# Patient Record
Sex: FEMALE | Race: OTHER | HISPANIC OR LATINO
[De-identification: names, ages, dates, MRNs, and addresses within clinical notes are randomized per-mention and may not be internally consistent; named-entity substitution may affect disease eponyms.]

---

## 2017-01-18 ENCOUNTER — APPOINTMENT (OUTPATIENT)
Dept: OBGYN | Facility: CLINIC | Age: 57
End: 2017-01-18

## 2017-01-18 VITALS
HEIGHT: 60 IN | BODY MASS INDEX: 29.84 KG/M2 | WEIGHT: 152 LBS | DIASTOLIC BLOOD PRESSURE: 80 MMHG | SYSTOLIC BLOOD PRESSURE: 126 MMHG

## 2017-01-18 DIAGNOSIS — Z78.9 OTHER SPECIFIED HEALTH STATUS: ICD-10-CM

## 2017-01-18 DIAGNOSIS — Z20.5 CONTACT WITH AND (SUSPECTED) EXPOSURE TO VIRAL HEPATITIS: ICD-10-CM

## 2017-01-18 LAB
DATE COLLECTED: NORMAL
HEMOCCULT SP1 STL QL: NEGATIVE
QUALITY CONTROL: YES

## 2017-01-20 LAB
APPEARANCE: CLEAR
BACTERIA UR CULT: NORMAL
BACTERIA: NEGATIVE
BILIRUBIN URINE: NEGATIVE
BLOOD URINE: NEGATIVE
COLOR: YELLOW
GLUCOSE QUALITATIVE U: NORMAL MG/DL
HYALINE CASTS: 1 /LPF
KETONES URINE: NEGATIVE
LEUKOCYTE ESTERASE URINE: ABNORMAL
MICROSCOPIC-UA: NORMAL
NITRITE URINE: NEGATIVE
PH URINE: 5.5
PROTEIN URINE: NEGATIVE MG/DL
RED BLOOD CELLS URINE: 2 /HPF
SPECIFIC GRAVITY URINE: 1.02
SQUAMOUS EPITHELIAL CELLS: 4 /HPF
UROBILINOGEN URINE: NORMAL MG/DL
WHITE BLOOD CELLS URINE: 4 /HPF

## 2017-01-27 LAB
HBV SURFACE AB SER QL: NONREACTIVE
HBV SURFACE AG SER QL: NONREACTIVE
HCV AB SER QL: NONREACTIVE
HCV S/CO RATIO: 0.11 S/CO
HIV1+2 AB SPEC QL IA.RAPID: NONREACTIVE
HPV HIGH+LOW RISK DNA PNL CVX: NEGATIVE
T PALLIDUM AB SER QL IA: NEGATIVE

## 2017-03-16 ENCOUNTER — APPOINTMENT (OUTPATIENT)
Dept: PULMONOLOGY | Facility: CLINIC | Age: 57
End: 2017-03-16

## 2017-03-16 VITALS — DIASTOLIC BLOOD PRESSURE: 60 MMHG | OXYGEN SATURATION: 98 % | HEART RATE: 81 BPM | SYSTOLIC BLOOD PRESSURE: 126 MMHG

## 2017-03-16 VITALS — WEIGHT: 152 LBS | BODY MASS INDEX: 29.69 KG/M2

## 2017-04-11 RX ORDER — FLUTICASONE FUROATE AND VILANTEROL TRIFENATATE 100; 25 UG/1; UG/1
100-25 POWDER RESPIRATORY (INHALATION) DAILY
Qty: 1 | Refills: 5 | Status: DISCONTINUED | COMMUNITY
Start: 2017-03-16 | End: 2017-04-11

## 2017-04-19 ENCOUNTER — APPOINTMENT (OUTPATIENT)
Dept: CARDIOLOGY | Facility: CLINIC | Age: 57
End: 2017-04-19

## 2017-04-25 ENCOUNTER — APPOINTMENT (OUTPATIENT)
Dept: CARDIOLOGY | Facility: CLINIC | Age: 57
End: 2017-04-25

## 2017-04-25 ENCOUNTER — NON-APPOINTMENT (OUTPATIENT)
Age: 57
End: 2017-04-25

## 2017-04-25 VITALS — DIASTOLIC BLOOD PRESSURE: 92 MMHG | SYSTOLIC BLOOD PRESSURE: 129 MMHG

## 2017-04-25 VITALS
DIASTOLIC BLOOD PRESSURE: 90 MMHG | WEIGHT: 156 LBS | BODY MASS INDEX: 30.63 KG/M2 | HEART RATE: 90 BPM | OXYGEN SATURATION: 98 % | SYSTOLIC BLOOD PRESSURE: 133 MMHG | HEIGHT: 60 IN

## 2017-06-15 ENCOUNTER — FORM ENCOUNTER (OUTPATIENT)
Age: 57
End: 2017-06-15

## 2017-06-16 ENCOUNTER — OUTPATIENT (OUTPATIENT)
Dept: OUTPATIENT SERVICES | Facility: HOSPITAL | Age: 57
LOS: 1 days | End: 2017-06-16
Payer: MEDICAID

## 2017-06-16 ENCOUNTER — APPOINTMENT (OUTPATIENT)
Dept: RADIOLOGY | Facility: CLINIC | Age: 57
End: 2017-06-16

## 2017-06-16 ENCOUNTER — APPOINTMENT (OUTPATIENT)
Dept: FAMILY MEDICINE | Facility: CLINIC | Age: 57
End: 2017-06-16

## 2017-06-16 VITALS
HEART RATE: 80 BPM | SYSTOLIC BLOOD PRESSURE: 130 MMHG | HEIGHT: 60 IN | BODY MASS INDEX: 29.45 KG/M2 | WEIGHT: 150 LBS | DIASTOLIC BLOOD PRESSURE: 80 MMHG

## 2017-06-16 DIAGNOSIS — N39.0 URINARY TRACT INFECTION, SITE NOT SPECIFIED: ICD-10-CM

## 2017-06-16 DIAGNOSIS — Z87.898 PERSONAL HISTORY OF OTHER SPECIFIED CONDITIONS: ICD-10-CM

## 2017-06-16 DIAGNOSIS — Z98.89 OTHER SPECIFIED POSTPROCEDURAL STATES: Chronic | ICD-10-CM

## 2017-06-16 DIAGNOSIS — M25.569 PAIN IN UNSPECIFIED KNEE: ICD-10-CM

## 2017-06-16 PROCEDURE — 73562 X-RAY EXAM OF KNEE 3: CPT

## 2017-06-19 PROBLEM — N39.0 ACUTE UTI: Status: RESOLVED | Noted: 2017-01-18 | Resolved: 2017-06-19

## 2017-07-27 ENCOUNTER — APPOINTMENT (OUTPATIENT)
Dept: FAMILY MEDICINE | Facility: CLINIC | Age: 57
End: 2017-07-27
Payer: MEDICAID

## 2017-07-27 VITALS
WEIGHT: 144 LBS | DIASTOLIC BLOOD PRESSURE: 80 MMHG | SYSTOLIC BLOOD PRESSURE: 120 MMHG | HEIGHT: 60 IN | HEART RATE: 72 BPM | BODY MASS INDEX: 28.27 KG/M2

## 2017-07-27 LAB — CYTOLOGY CVX/VAG DOC THIN PREP: NORMAL

## 2017-07-27 PROCEDURE — 99214 OFFICE O/P EST MOD 30 MIN: CPT

## 2017-08-14 ENCOUNTER — FORM ENCOUNTER (OUTPATIENT)
Age: 57
End: 2017-08-14

## 2017-08-15 ENCOUNTER — APPOINTMENT (OUTPATIENT)
Dept: RADIOLOGY | Facility: CLINIC | Age: 57
End: 2017-08-15
Payer: MEDICAID

## 2017-08-15 ENCOUNTER — APPOINTMENT (OUTPATIENT)
Dept: MAMMOGRAPHY | Facility: CLINIC | Age: 57
End: 2017-08-15
Payer: MEDICAID

## 2017-08-15 ENCOUNTER — OUTPATIENT (OUTPATIENT)
Dept: OUTPATIENT SERVICES | Facility: HOSPITAL | Age: 57
LOS: 1 days | End: 2017-08-15
Payer: MEDICAID

## 2017-08-15 DIAGNOSIS — Z00.00 ENCOUNTER FOR GENERAL ADULT MEDICAL EXAMINATION WITHOUT ABNORMAL FINDINGS: ICD-10-CM

## 2017-08-15 DIAGNOSIS — Z98.89 OTHER SPECIFIED POSTPROCEDURAL STATES: Chronic | ICD-10-CM

## 2017-08-15 PROCEDURE — 77063 BREAST TOMOSYNTHESIS BI: CPT | Mod: 26

## 2017-08-15 PROCEDURE — 77080 DXA BONE DENSITY AXIAL: CPT

## 2017-08-15 PROCEDURE — 77080 DXA BONE DENSITY AXIAL: CPT | Mod: 26

## 2017-08-15 PROCEDURE — 77067 SCR MAMMO BI INCL CAD: CPT

## 2017-08-15 PROCEDURE — G0202: CPT | Mod: 26

## 2017-08-15 PROCEDURE — 77063 BREAST TOMOSYNTHESIS BI: CPT

## 2017-09-07 ENCOUNTER — APPOINTMENT (OUTPATIENT)
Dept: PULMONOLOGY | Facility: CLINIC | Age: 57
End: 2017-09-07

## 2017-09-07 RX ORDER — CHLORHEXIDINE GLUCONATE, 0.12% ORAL RINSE 1.2 MG/ML
0.12 SOLUTION DENTAL
Qty: 473 | Refills: 0 | Status: DISCONTINUED | COMMUNITY
Start: 2017-03-08

## 2017-09-21 ENCOUNTER — OTHER (OUTPATIENT)
Age: 57
End: 2017-09-21

## 2017-10-03 ENCOUNTER — APPOINTMENT (OUTPATIENT)
Dept: FAMILY MEDICINE | Facility: CLINIC | Age: 57
End: 2017-10-03
Payer: MEDICAID

## 2017-10-03 VITALS
WEIGHT: 146 LBS | OXYGEN SATURATION: 99 % | TEMPERATURE: 98 F | BODY MASS INDEX: 28.51 KG/M2 | SYSTOLIC BLOOD PRESSURE: 112 MMHG | HEART RATE: 92 BPM | DIASTOLIC BLOOD PRESSURE: 80 MMHG

## 2017-10-03 PROCEDURE — 99213 OFFICE O/P EST LOW 20 MIN: CPT | Mod: 25

## 2017-10-03 PROCEDURE — 86580 TB INTRADERMAL TEST: CPT

## 2017-10-03 PROCEDURE — G0008: CPT

## 2017-10-03 PROCEDURE — 90686 IIV4 VACC NO PRSV 0.5 ML IM: CPT

## 2017-10-03 PROCEDURE — 99396 PREV VISIT EST AGE 40-64: CPT | Mod: 25

## 2017-10-16 ENCOUNTER — LABORATORY RESULT (OUTPATIENT)
Age: 57
End: 2017-10-16

## 2017-11-29 ENCOUNTER — APPOINTMENT (OUTPATIENT)
Dept: FAMILY MEDICINE | Facility: CLINIC | Age: 57
End: 2017-11-29
Payer: MEDICAID

## 2017-11-29 VITALS
DIASTOLIC BLOOD PRESSURE: 82 MMHG | HEIGHT: 60 IN | TEMPERATURE: 99.1 F | WEIGHT: 148 LBS | HEART RATE: 88 BPM | SYSTOLIC BLOOD PRESSURE: 140 MMHG | OXYGEN SATURATION: 98 % | BODY MASS INDEX: 29.06 KG/M2

## 2017-11-29 PROCEDURE — 99214 OFFICE O/P EST MOD 30 MIN: CPT

## 2017-12-06 LAB
25(OH)D3 SERPL-MCNC: 27.3 NG/ML
ALBUMIN SERPL ELPH-MCNC: 4.7 G/DL
ALP BLD-CCNC: 122 U/L
ALT SERPL-CCNC: 43 U/L
ANION GAP SERPL CALC-SCNC: 16 MMOL/L
APPEARANCE: CLEAR
AST SERPL-CCNC: 31 U/L
BASOPHILS # BLD AUTO: 0.03 K/UL
BASOPHILS NFR BLD AUTO: 0.6 %
BILIRUB SERPL-MCNC: 0.2 MG/DL
BILIRUBIN URINE: NEGATIVE
BLOOD URINE: NEGATIVE
BUN SERPL-MCNC: 21 MG/DL
CALCIUM SERPL-MCNC: 10.4 MG/DL
CHLORIDE SERPL-SCNC: 99 MMOL/L
CHOLEST SERPL-MCNC: 258 MG/DL
CHOLEST/HDLC SERPL: 3.9 RATIO
CO2 SERPL-SCNC: 26 MMOL/L
COLOR: YELLOW
CREAT SERPL-MCNC: 0.84 MG/DL
EOSINOPHIL # BLD AUTO: 0.05 K/UL
EOSINOPHIL NFR BLD AUTO: 1 %
FERRITIN SERPL-MCNC: 138 NG/ML
FOLATE SERPL-MCNC: >20 NG/ML
GLUCOSE QUALITATIVE U: NEGATIVE MG/DL
GLUCOSE SERPL-MCNC: 108 MG/DL
HBA1C MFR BLD HPLC: 5.6 %
HCT VFR BLD CALC: 38.9 %
HDLC SERPL-MCNC: 67 MG/DL
HGB BLD-MCNC: 12.9 G/DL
IMM GRANULOCYTES NFR BLD AUTO: 0.2 %
IRON SATN MFR SERPL: 23 %
IRON SERPL-MCNC: 81 UG/DL
KETONES URINE: ABNORMAL
LDLC SERPL CALC-MCNC: 159 MG/DL
LEUKOCYTE ESTERASE URINE: ABNORMAL
LYMPHOCYTES # BLD AUTO: 0.3 K/UL
LYMPHOCYTES NFR BLD AUTO: 5.7 %
MAN DIFF?: NORMAL
MCHC RBC-ENTMCNC: 29.1 PG
MCHC RBC-ENTMCNC: 33.2 GM/DL
MCV RBC AUTO: 87.6 FL
MONOCYTES # BLD AUTO: 0.62 K/UL
MONOCYTES NFR BLD AUTO: 11.8 %
NEUTROPHILS # BLD AUTO: 4.25 K/UL
NEUTROPHILS NFR BLD AUTO: 80.7 %
NITRITE URINE: NEGATIVE
PH URINE: 5
PLATELET # BLD AUTO: 277 K/UL
POTASSIUM SERPL-SCNC: 4.1 MMOL/L
PROT SERPL-MCNC: 8 G/DL
PROTEIN URINE: NEGATIVE MG/DL
RBC # BLD: 4.44 M/UL
RBC # FLD: 14.2 %
SODIUM SERPL-SCNC: 141 MMOL/L
SPECIFIC GRAVITY URINE: 1.03
T4 FREE SERPL-MCNC: 1.3 NG/DL
TIBC SERPL-MCNC: 348 UG/DL
TRIGL SERPL-MCNC: 158 MG/DL
TSH SERPL-ACNC: 2.69 UIU/ML
UIBC SERPL-MCNC: 267 UG/DL
UROBILINOGEN URINE: NEGATIVE MG/DL
VIT B12 SERPL-MCNC: 424 PG/ML
WBC # FLD AUTO: 5.26 K/UL

## 2018-01-03 ENCOUNTER — APPOINTMENT (OUTPATIENT)
Dept: OBGYN | Facility: CLINIC | Age: 58
End: 2018-01-03
Payer: MEDICAID

## 2018-01-03 VITALS — HEIGHT: 61 IN | DIASTOLIC BLOOD PRESSURE: 80 MMHG | SYSTOLIC BLOOD PRESSURE: 140 MMHG

## 2018-01-03 DIAGNOSIS — Z80.3 FAMILY HISTORY OF MALIGNANT NEOPLASM OF BREAST: ICD-10-CM

## 2018-01-03 DIAGNOSIS — Z82.49 FAMILY HISTORY OF ISCHEMIC HEART DISEASE AND OTHER DISEASES OF THE CIRCULATORY SYSTEM: ICD-10-CM

## 2018-01-03 DIAGNOSIS — Z82.3 FAMILY HISTORY OF STROKE: ICD-10-CM

## 2018-01-03 PROCEDURE — 99214 OFFICE O/P EST MOD 30 MIN: CPT

## 2018-01-05 ENCOUNTER — MEDICATION RENEWAL (OUTPATIENT)
Age: 58
End: 2018-01-05

## 2018-01-05 LAB
CANDIDA VAG CYTO: NOT DETECTED
G VAGINALIS+PREV SP MTYP VAG QL MICRO: DETECTED
T VAGINALIS VAG QL WET PREP: NOT DETECTED

## 2018-02-07 ENCOUNTER — APPOINTMENT (OUTPATIENT)
Dept: OBGYN | Facility: CLINIC | Age: 58
End: 2018-02-07
Payer: MEDICAID

## 2018-02-07 VITALS
SYSTOLIC BLOOD PRESSURE: 130 MMHG | DIASTOLIC BLOOD PRESSURE: 70 MMHG | BODY MASS INDEX: 28.89 KG/M2 | HEIGHT: 61 IN | WEIGHT: 153 LBS

## 2018-02-07 LAB
DATE COLLECTED: NORMAL
HEMOCCULT SP1 STL QL: NEGATIVE
QUALITY CONTROL: YES

## 2018-02-07 PROCEDURE — 82270 OCCULT BLOOD FECES: CPT

## 2018-02-07 PROCEDURE — 99396 PREV VISIT EST AGE 40-64: CPT

## 2018-02-12 LAB
CANDIDA VAG CYTO: NOT DETECTED
CYTOLOGY CVX/VAG DOC THIN PREP: NORMAL
G VAGINALIS+PREV SP MTYP VAG QL MICRO: NOT DETECTED
HPV HIGH+LOW RISK DNA PNL CVX: NOT DETECTED
T VAGINALIS VAG QL WET PREP: NOT DETECTED

## 2018-02-28 ENCOUNTER — APPOINTMENT (OUTPATIENT)
Dept: PULMONOLOGY | Facility: CLINIC | Age: 58
End: 2018-02-28
Payer: MEDICAID

## 2018-02-28 VITALS
HEART RATE: 84 BPM | OXYGEN SATURATION: 98 % | SYSTOLIC BLOOD PRESSURE: 140 MMHG | DIASTOLIC BLOOD PRESSURE: 80 MMHG | RESPIRATION RATE: 16 BRPM

## 2018-02-28 PROCEDURE — 99214 OFFICE O/P EST MOD 30 MIN: CPT | Mod: 25

## 2018-02-28 PROCEDURE — 94060 EVALUATION OF WHEEZING: CPT

## 2018-02-28 PROCEDURE — 94664 DEMO&/EVAL PT USE INHALER: CPT | Mod: 59

## 2018-02-28 RX ORDER — AZITHROMYCIN 250 MG/1
250 TABLET, FILM COATED ORAL
Qty: 6 | Refills: 0 | Status: DISCONTINUED | COMMUNITY
Start: 2017-11-29 | End: 2018-02-28

## 2018-02-28 RX ORDER — CLINDAMYCIN PHOSPHATE 20 MG/G
2 CREAM VAGINAL
Qty: 7 | Refills: 1 | Status: DISCONTINUED | COMMUNITY
Start: 2018-01-03 | End: 2018-02-28

## 2018-02-28 RX ORDER — ACETAMINOPHEN AND CODEINE 300; 30 MG/1; MG/1
300-30 TABLET ORAL
Qty: 12 | Refills: 0 | Status: DISCONTINUED | COMMUNITY
Start: 2017-03-08 | End: 2018-02-28

## 2018-03-12 ENCOUNTER — APPOINTMENT (OUTPATIENT)
Dept: FAMILY MEDICINE | Facility: CLINIC | Age: 58
End: 2018-03-12
Payer: MEDICAID

## 2018-03-12 VITALS
DIASTOLIC BLOOD PRESSURE: 80 MMHG | WEIGHT: 155 LBS | HEART RATE: 80 BPM | HEIGHT: 61 IN | SYSTOLIC BLOOD PRESSURE: 126 MMHG | BODY MASS INDEX: 29.27 KG/M2

## 2018-03-12 DIAGNOSIS — Z87.42 PERSONAL HISTORY OF OTHER DISEASES OF THE FEMALE GENITAL TRACT: ICD-10-CM

## 2018-03-12 DIAGNOSIS — N76.0 ACUTE VAGINITIS: ICD-10-CM

## 2018-03-12 DIAGNOSIS — Z11.1 ENCOUNTER FOR SCREENING FOR RESPIRATORY TUBERCULOSIS: ICD-10-CM

## 2018-03-12 DIAGNOSIS — Z87.09 PERSONAL HISTORY OF OTHER DISEASES OF THE RESPIRATORY SYSTEM: ICD-10-CM

## 2018-03-12 DIAGNOSIS — L91.8 OTHER HYPERTROPHIC DISORDERS OF THE SKIN: ICD-10-CM

## 2018-03-12 DIAGNOSIS — B96.89 ACUTE VAGINITIS: ICD-10-CM

## 2018-03-12 DIAGNOSIS — Z87.828 PERSONAL HISTORY OF OTHER (HEALED) PHYSICAL INJURY AND TRAUMA: ICD-10-CM

## 2018-03-12 PROCEDURE — 99214 OFFICE O/P EST MOD 30 MIN: CPT

## 2018-03-15 PROBLEM — Z87.42 HISTORY OF VAGINAL DISCHARGE: Status: RESOLVED | Noted: 2018-01-03 | Resolved: 2018-03-15

## 2018-03-15 PROBLEM — N76.0 BACTERIAL VAGINOSIS: Status: RESOLVED | Noted: 2018-01-03 | Resolved: 2018-03-15

## 2018-03-15 PROBLEM — Z11.1 SCREENING FOR TUBERCULOSIS: Status: RESOLVED | Noted: 2017-10-03 | Resolved: 2018-03-15

## 2018-03-19 ENCOUNTER — MEDICATION RENEWAL (OUTPATIENT)
Age: 58
End: 2018-03-19

## 2018-03-19 RX ORDER — FLUTICASONE PROPIONATE AND SALMETEROL 50; 250 UG/1; UG/1
250-50 POWDER RESPIRATORY (INHALATION)
Qty: 1 | Refills: 5 | Status: DISCONTINUED | COMMUNITY
Start: 2018-02-28 | End: 2018-03-19

## 2018-06-04 ENCOUNTER — APPOINTMENT (OUTPATIENT)
Dept: ORTHOPEDIC SURGERY | Facility: CLINIC | Age: 58
End: 2018-06-04

## 2018-07-16 ENCOUNTER — FORM ENCOUNTER (OUTPATIENT)
Age: 58
End: 2018-07-16

## 2018-07-17 ENCOUNTER — APPOINTMENT (OUTPATIENT)
Dept: RADIOLOGY | Facility: CLINIC | Age: 58
End: 2018-07-17
Payer: MEDICAID

## 2018-07-17 ENCOUNTER — OUTPATIENT (OUTPATIENT)
Dept: OUTPATIENT SERVICES | Facility: HOSPITAL | Age: 58
LOS: 1 days | End: 2018-07-17
Payer: MEDICAID

## 2018-07-17 DIAGNOSIS — D86.9 SARCOIDOSIS, UNSPECIFIED: ICD-10-CM

## 2018-07-17 DIAGNOSIS — Z98.89 OTHER SPECIFIED POSTPROCEDURAL STATES: Chronic | ICD-10-CM

## 2018-07-17 PROCEDURE — 71046 X-RAY EXAM CHEST 2 VIEWS: CPT | Mod: 26

## 2018-07-17 PROCEDURE — 71046 X-RAY EXAM CHEST 2 VIEWS: CPT

## 2018-07-24 ENCOUNTER — NON-APPOINTMENT (OUTPATIENT)
Age: 58
End: 2018-07-24

## 2018-07-24 ENCOUNTER — APPOINTMENT (OUTPATIENT)
Dept: CARDIOLOGY | Facility: CLINIC | Age: 58
End: 2018-07-24
Payer: MEDICAID

## 2018-07-24 VITALS
WEIGHT: 152 LBS | DIASTOLIC BLOOD PRESSURE: 86 MMHG | BODY MASS INDEX: 28.7 KG/M2 | HEART RATE: 84 BPM | HEIGHT: 61 IN | OXYGEN SATURATION: 97 % | SYSTOLIC BLOOD PRESSURE: 144 MMHG

## 2018-07-24 PROCEDURE — 93000 ELECTROCARDIOGRAM COMPLETE: CPT

## 2018-07-24 PROCEDURE — 99215 OFFICE O/P EST HI 40 MIN: CPT

## 2018-08-07 ENCOUNTER — APPOINTMENT (OUTPATIENT)
Dept: CARDIOLOGY | Facility: CLINIC | Age: 58
End: 2018-08-07

## 2018-10-31 ENCOUNTER — APPOINTMENT (OUTPATIENT)
Dept: PULMONOLOGY | Facility: CLINIC | Age: 58
End: 2018-10-31

## 2019-01-16 ENCOUNTER — APPOINTMENT (OUTPATIENT)
Dept: PULMONOLOGY | Facility: CLINIC | Age: 59
End: 2019-01-16
Payer: COMMERCIAL

## 2019-01-16 VITALS — DIASTOLIC BLOOD PRESSURE: 82 MMHG | OXYGEN SATURATION: 99 % | SYSTOLIC BLOOD PRESSURE: 130 MMHG | HEART RATE: 83 BPM

## 2019-01-16 VITALS — BODY MASS INDEX: 28.34 KG/M2 | WEIGHT: 150 LBS

## 2019-01-16 PROCEDURE — 94010 BREATHING CAPACITY TEST: CPT

## 2019-01-16 PROCEDURE — 99215 OFFICE O/P EST HI 40 MIN: CPT | Mod: 25

## 2019-01-16 RX ORDER — TRIAMCINOLONE ACETONIDE 1 MG/G
0.1 CREAM TOPICAL
Qty: 30 | Refills: 0 | Status: DISCONTINUED | COMMUNITY
Start: 2018-02-07 | End: 2019-01-16

## 2019-01-16 RX ORDER — NYSTATIN 100000 [USP'U]/G
100000 CREAM TOPICAL
Qty: 30 | Refills: 0 | Status: DISCONTINUED | COMMUNITY
Start: 2018-02-07 | End: 2019-01-16

## 2019-01-16 RX ORDER — MOMETASONE FUROATE AND FORMOTEROL FUMARATE DIHYDRATE 100; 5 UG/1; UG/1
100-5 AEROSOL RESPIRATORY (INHALATION) TWICE DAILY
Qty: 1 | Refills: 5 | Status: DISCONTINUED | COMMUNITY
Start: 2017-04-11 | End: 2019-01-16

## 2019-01-16 RX ORDER — NYSTATIN AND TRIAMCINOLONE ACETONIDE 100000; 1 MG/G; MG/G
100000-0.1 CREAM TOPICAL TWICE DAILY
Qty: 1 | Refills: 5 | Status: DISCONTINUED | COMMUNITY
Start: 2018-02-07 | End: 2019-01-16

## 2019-01-16 NOTE — CONSULT LETTER
[Dear  ___] : Dear  [unfilled], [Consult Letter:] : I had the pleasure of evaluating your patient, [unfilled]. [Please see my note below.] : Please see my note below. [Consult Closing:] : Thank you very much for allowing me to participate in the care of this patient.  If you have any questions, please do not hesitate to contact me. [Sincerely,] : Sincerely, [Leonel Jefferson DO, [facility]] : Leonel Jefferson DO, [facility] [Director, Respiratory Care] : Director, Respiratory Care [[facility]] : [facility] [FreeTextEntry3] : [facility]\par Pul[facility] Critical Care\par Director Pulmonary Division\par Medical Director Respiratory Therapy\par [facility]\par \par

## 2019-01-16 NOTE — REVIEW OF SYSTEMS
[As Noted in HPI] : as noted in HPI [Negative] : Dermatologic [FreeTextEntry9] : cardiac MRI negative, had syncope, BOP meds adjusted [FreeTextEntry7] : cramping

## 2019-01-16 NOTE — PHYSICAL EXAM
[General Appearance - Well Developed] : well developed [Normal Appearance] : normal appearance [General Appearance - Well Nourished] : well nourished [No Deformities] : no deformities [Neck Appearance] : the appearance of the neck was normal [Heart Rate And Rhythm] : heart rate and rhythm were normal [Heart Sounds] : normal S1 and S2 [Murmurs] : no murmurs present [Edema] : no peripheral edema present [Respiration, Rhythm And Depth] : normal respiratory rhythm and effort [Auscultation Breath Sounds / Voice Sounds] : lungs were clear to auscultation bilaterally [Lungs Percussion] : the lungs were normal to percussion [Abnormal Walk] : normal gait [Nail Clubbing] : no clubbing of the fingernails [Cyanosis, Localized] : no localized cyanosis [] : no rash [No Focal Deficits] : no focal deficits [Oriented To Time, Place, And Person] : oriented to person, place, and time [Impaired Insight] : insight and judgment were intact [Affect] : the affect was normal [Memory Recent] : recent memory was not impaired [FreeTextEntry1] : No chest wall abnormality

## 2019-01-16 NOTE — DISCUSSION/SUMMARY
[FreeTextEntry1] : Sarcoidosis radiographic stage II, \par complicated by moderate persistent asthma, worsening dry cough, some rhinitis and GERD\par exam without bronchospasm, normal sp02, \par Spirometry today with moderate air flow obstruction,minimal change from 2/18\par continue Breo, predniosne taper and abx given\par will also treat GERD ( pepcid) and Rhinitis ( non sedating anti histamine - no D)\par obtain CXR\par cardiac MRI negative in past\par vaccinations this fall \par 3-4  months or sooner if needed, to call if no improvement

## 2019-01-23 ENCOUNTER — APPOINTMENT (OUTPATIENT)
Dept: FAMILY MEDICINE | Facility: CLINIC | Age: 59
End: 2019-01-23
Payer: COMMERCIAL

## 2019-01-23 VITALS — DIASTOLIC BLOOD PRESSURE: 84 MMHG | SYSTOLIC BLOOD PRESSURE: 160 MMHG

## 2019-01-23 VITALS
DIASTOLIC BLOOD PRESSURE: 70 MMHG | BODY MASS INDEX: 28.77 KG/M2 | HEART RATE: 102 BPM | HEIGHT: 61 IN | OXYGEN SATURATION: 99 % | TEMPERATURE: 97.8 F | WEIGHT: 152.38 LBS | SYSTOLIC BLOOD PRESSURE: 152 MMHG

## 2019-01-23 DIAGNOSIS — N76.2 ACUTE VULVITIS: ICD-10-CM

## 2019-01-23 PROCEDURE — 99214 OFFICE O/P EST MOD 30 MIN: CPT

## 2019-01-23 NOTE — PHYSICAL EXAM
[Normal Sclera/Conjunctiva] : normal sclera/conjunctiva [Supple] : supple [No Respiratory Distress] : no respiratory distress  [Clear to Auscultation] : lungs were clear to auscultation bilaterally [Normal Rate] : normal rate  [Regular Rhythm] : with a regular rhythm [Soft] : abdomen soft [Non Tender] : non-tender [Normal Gait] : normal gait [No Focal Deficits] : no focal deficits [Alert and Oriented x3] : oriented to person, place, and time [Normal Insight/Judgement] : insight and judgment were intact [de-identified] : a bit tearful whne offering stressors in her life   readily recovers with support  [de-identified] : Shingles evident with drying vesicle of RIGHT mid back extending in deramtone pattern to lateral side and anteriorly.

## 2019-01-23 NOTE — REVIEW OF SYSTEMS
[Cough] : cough [Heartburn] : heartburn [Skin Rash] : skin rash [Anxiety] : anxiety [Negative] : ENT [Fever] : no fever [Chills] : no chills [Chest Pain] : no chest pain [Palpitations] : no palpitations [Shortness Of Breath] : no shortness of breath [Wheezing] : no wheezing [Abdominal Pain] : no abdominal pain [Headache] : no headache [Dizziness] : no dizziness [Memory Loss] : no memory loss [FreeTextEntry3] : eyeglasses  [FreeTextEntry7] : recent diagnosis of reflux by Pulmonologist; Prescribe Famotidine; symptoms improved.  [de-identified] : as in HPI

## 2019-01-23 NOTE — HISTORY OF PRESENT ILLNESS
[FreeTextEntry8] : Last seen in March 2018 for acute visit and encounter reviewed.\par Condition stabilized. \par \par Presents today in Fu s/p recent UC visit and diagnosed with SHINGLES.\par BP was elevated at visit and advised FU with PMD. \par \par Recent Pulmonology encounter reviewed and appreciated.\par Cardiology in JULY noted as well for stability  DUE to lapse in Insurance, did not FU. Will do so.

## 2019-01-23 NOTE — ASSESSMENT
[FreeTextEntry1] : \par Shingles healing as expected gratefully limited, paresthesias.  Continue Valtrex to completion.\par \par Hypertension   was treated in the past, however, not recent years.  Advised followup in 2 weeks for further assessment.

## 2019-02-03 ENCOUNTER — FORM ENCOUNTER (OUTPATIENT)
Age: 59
End: 2019-02-03

## 2019-02-04 ENCOUNTER — LABORATORY RESULT (OUTPATIENT)
Age: 59
End: 2019-02-04

## 2019-02-04 ENCOUNTER — APPOINTMENT (OUTPATIENT)
Dept: FAMILY MEDICINE | Facility: CLINIC | Age: 59
End: 2019-02-04
Payer: COMMERCIAL

## 2019-02-04 ENCOUNTER — APPOINTMENT (OUTPATIENT)
Dept: RADIOLOGY | Facility: CLINIC | Age: 59
End: 2019-02-04

## 2019-02-04 ENCOUNTER — OUTPATIENT (OUTPATIENT)
Dept: OUTPATIENT SERVICES | Facility: HOSPITAL | Age: 59
LOS: 1 days | End: 2019-02-04
Payer: COMMERCIAL

## 2019-02-04 VITALS
HEIGHT: 61 IN | SYSTOLIC BLOOD PRESSURE: 130 MMHG | HEART RATE: 84 BPM | DIASTOLIC BLOOD PRESSURE: 80 MMHG | BODY MASS INDEX: 29.27 KG/M2 | WEIGHT: 155 LBS

## 2019-02-04 DIAGNOSIS — Z23 ENCOUNTER FOR IMMUNIZATION: ICD-10-CM

## 2019-02-04 DIAGNOSIS — Z98.89 OTHER SPECIFIED POSTPROCEDURAL STATES: Chronic | ICD-10-CM

## 2019-02-04 DIAGNOSIS — K21.9 GASTRO-ESOPHAGEAL REFLUX DISEASE WITHOUT ESOPHAGITIS: ICD-10-CM

## 2019-02-04 PROCEDURE — 71046 X-RAY EXAM CHEST 2 VIEWS: CPT | Mod: 26

## 2019-02-04 PROCEDURE — 90471 IMMUNIZATION ADMIN: CPT

## 2019-02-04 PROCEDURE — 71046 X-RAY EXAM CHEST 2 VIEWS: CPT

## 2019-02-04 PROCEDURE — 36415 COLL VENOUS BLD VENIPUNCTURE: CPT

## 2019-02-04 PROCEDURE — 90686 IIV4 VACC NO PRSV 0.5 ML IM: CPT

## 2019-02-04 PROCEDURE — 99396 PREV VISIT EST AGE 40-64: CPT | Mod: 25

## 2019-02-04 RX ORDER — PREDNISONE 10 MG/1
10 TABLET ORAL
Qty: 30 | Refills: 6 | Status: DISCONTINUED | COMMUNITY
Start: 2019-01-16 | End: 2019-02-04

## 2019-02-04 RX ORDER — AZITHROMYCIN 250 MG/1
250 TABLET, FILM COATED ORAL
Qty: 1 | Refills: 6 | Status: DISCONTINUED | COMMUNITY
Start: 2019-01-16 | End: 2019-02-04

## 2019-02-04 NOTE — PAST MEDICAL HISTORY
[Surgical Menopause] : in surgical menopause [Menopause Age____] : age at menopause was [unfilled] [Total Preg ___] : G: [unfilled] [Live Births___] : P: [unfilled] [FreeTextEntry1] : Dr. Nani Remy

## 2019-02-04 NOTE — PHYSICAL EXAM
[General Appearance - Alert] : alert [General Appearance - In No Acute Distress] : in no acute distress [General Appearance - Well-Appearing] : healthy appearing [Sclera] : the sclera and conjunctiva were normal [Both Tympanic Membranes Were Examined] : both tympanic membranes were normal [Respiration, Rhythm And Depth] : normal respiratory rhythm and effort [Auscultation Breath Sounds / Voice Sounds] : lungs were clear to auscultation bilaterally [Heart Rate And Rhythm] : heart rate was normal and rhythm regular [Heart Sounds] : normal S1 and S2 [Edema] : there was no peripheral edema [Veins - Varicosity Changes] : there were no varicosital changes [Bowel Sounds] : normal bowel sounds [Abdomen Soft] : soft [Abdomen Mass (___ Cm)] : no abdominal mass palpated [Cervical Lymph Nodes Enlarged Posterior Bilaterally] : posterior cervical [Cervical Lymph Nodes Enlarged Anterior Bilaterally] : anterior cervical [Supraclavicular Lymph Nodes Enlarged Bilaterally] : supraclavicular [Abnormal Walk] : normal gait [Nail Clubbing] : no clubbing  or cyanosis of the fingernails [Involuntary Movements] : no involuntary movements were seen [Skin Turgor] : normal skin turgor [] : no rash [Deep Tendon Reflexes (DTR)] : deep tendon reflexes were 2+ and symmetric [No Focal Deficits] : no focal deficits [Oriented To Time, Place, And Person] : oriented to person, place, and time [Impaired Insight] : insight and judgment were intact [PERRL With Normal Accommodation] : pupils were equal in size, round, and reactive to light [Oropharynx] : the oropharynx was normal [Thyroid Diffuse Enlargement] : the thyroid was not enlarged [FreeTextEntry1] : warm and dry

## 2019-02-04 NOTE — HEALTH RISK ASSESSMENT
[Very Good] : ~his/her~  mood as very good [Patient reported mammogram was normal] : Patient reported mammogram was normal [Patient reported PAP Smear was normal] : Patient reported PAP Smear was normal [Patient reported colonoscopy was normal] : Patient reported colonoscopy was normal [Financial] : financial [Employed] : employed [College] : College [] :  [# Of Children ___] : has [unfilled] children [Feels Safe at Home] : Feels safe at home [Fully functional (bathing, dressing, toileting, transferring, walking, feeding)] : Fully functional (bathing, dressing, toileting, transferring, walking, feeding) [Fully functional (using the telephone, shopping, preparing meals, housekeeping, doing laundry, using] : Fully functional and needs no help or supervision to perform IADLs (using the telephone, shopping, preparing meals, housekeeping, doing laundry, using transportation, managing medications and managing finances) [Smoke Detector] : smoke detector [Seat Belt] :  uses seat belt [de-identified] :   for shingles [de-identified] : Pulmonology   Cardiology   [Change in mental status noted] : No change in mental status noted [Reports changes in hearing] : Reports no changes in hearing [Guns at Home] : no guns at home [Travel to Developing Areas] : does not  travel to developing areas [MammogramDate] : 2017  [PapSmearDate] : 2/18 [BoneDensityDate] : 2015  [ColonoscopyDate] : states  2 years ago  [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

## 2019-02-04 NOTE — HISTORY OF PRESENT ILLNESS
[de-identified] : Chart reviewed: \par Recent shingles now RESOLVED. \par \par Cardiology and pulmonology reviewed and appreciated fro stability. \par No on Famotidine for reflux /cough . \par DEBRA was not covered by insurance.  Has FU with cardiology in March.  [FreeTextEntry1] : In review: Jan 2015 \par 54 year old [place]  female presents today to establish care being referred to me by insurance.\par She is a bit medical history significant for sarcoidosis, which was diagnosed at the age of 39.  \par Follow with Pulmonology and reports stability.  We'll request office know.  Also has a history of hypertension.\par  Recent weeks, feels upper chest discomfort with worsening cough and nasal congestion.\par No worsening SOB. No fever or chills. \par Not able to get appt. with pulmonology until next week.\par \par She describes an episode of blacking out in mid December.  She was in her kitchen standing at near counter  when she sensed blackness and woke up on the floor.  This event was unwitnessed.  One other episode in her lifetime years ago with cardiac  workup negative.\par \par Social:   from .  2 children  ( 21 and 25 )  live with him.\par attempting employment in cosmetic industry.  Mother lives in area and a support.

## 2019-02-04 NOTE — ASSESSMENT
[FreeTextEntry1] :  Well exam for 58  year old female  with PMH as stated in HPI / active list. \par \par Management : \par \par See HPI and Plan\par \par Labs in office today.   Will advise. \par \par Best wishes offered !\par

## 2019-02-04 NOTE — DISCUSSION/SUMMARY
[Weight loss counseling given] : Weight loss counseling given [150 min/wk aerobic activity @ 60% MHR recommended] : 150 min/wk aerobic activity @ 60% MHR recommended [Non - Smoker] : non-smoker [FreeTextEntry2] : advised of the value of a dedicated daily walk [FreeTextEntry1] : CPE for 54 year old WF past medical history significant for sarcoidosis and hypertension.\par \par URI complaints evaluated for normal physical exam and likely viral etiology.   We'll continue to monitor and supportive therapies reviewed and advised.  Followup with pulmonology.\par \par Referred to  cardiology in consideration of syncopal MACK.

## 2019-02-04 NOTE — REVIEW OF SYSTEMS
[see HPI] : see HPI [Fever] : no fever [Chills] : no chills [Feeling Tired] : not feeling tired [Eyesight Problems] : no eyesight problems [Dry Eyes] : no dryness of the eyes [Cough] : no cough [SOB on Exertion] : no shortness of breath during exertion [PND] : no PND [Melena] : no melena [Confused] : no confusion [Dizziness] : no dizziness [Difficulty Walking] : no difficulty walking [Sleep Disturbances] : no sleep disturbances [Anxiety] : anxiety [Change In Personality] : no personality change [Negative] : Gastrointestinal

## 2019-02-05 ENCOUNTER — OTHER (OUTPATIENT)
Age: 59
End: 2019-02-05

## 2019-02-08 LAB
ALBUMIN SERPL ELPH-MCNC: 4.4 G/DL
ALP BLD-CCNC: 95 U/L
ALT SERPL-CCNC: 28 U/L
ANION GAP SERPL CALC-SCNC: 16 MMOL/L
APPEARANCE: ABNORMAL
AST SERPL-CCNC: 26 U/L
BILIRUB SERPL-MCNC: 0.2 MG/DL
BILIRUBIN URINE: NEGATIVE
BLOOD URINE: NEGATIVE
BUN SERPL-MCNC: 15 MG/DL
CALCIUM SERPL-MCNC: 9.2 MG/DL
CHLORIDE SERPL-SCNC: 101 MMOL/L
CHOLEST SERPL-MCNC: 218 MG/DL
CHOLEST/HDLC SERPL: 3.5 RATIO
CO2 SERPL-SCNC: 22 MMOL/L
COLOR: YELLOW
CREAT SERPL-MCNC: 0.63 MG/DL
GLUCOSE QUALITATIVE U: NEGATIVE MG/DL
GLUCOSE SERPL-MCNC: 111 MG/DL
HBA1C MFR BLD HPLC: 5.5 %
HDLC SERPL-MCNC: 63 MG/DL
KETONES URINE: NEGATIVE
LDLC SERPL CALC-MCNC: 135 MG/DL
LEUKOCYTE ESTERASE URINE: NEGATIVE
NITRITE URINE: NEGATIVE
PH URINE: 5
POTASSIUM SERPL-SCNC: 3.6 MMOL/L
PROT SERPL-MCNC: 7.1 G/DL
PROTEIN URINE: NEGATIVE MG/DL
SODIUM SERPL-SCNC: 140 MMOL/L
SPECIFIC GRAVITY URINE: 1.02
TRIGL SERPL-MCNC: 102 MG/DL
TSH SERPL-ACNC: 1.67 UIU/ML
UROBILINOGEN URINE: NEGATIVE MG/DL

## 2019-02-13 ENCOUNTER — APPOINTMENT (OUTPATIENT)
Dept: OBGYN | Facility: CLINIC | Age: 59
End: 2019-02-13

## 2019-02-14 ENCOUNTER — FORM ENCOUNTER (OUTPATIENT)
Age: 59
End: 2019-02-14

## 2019-02-15 ENCOUNTER — OUTPATIENT (OUTPATIENT)
Dept: OUTPATIENT SERVICES | Facility: HOSPITAL | Age: 59
LOS: 1 days | End: 2019-02-15
Payer: COMMERCIAL

## 2019-02-15 ENCOUNTER — APPOINTMENT (OUTPATIENT)
Dept: CT IMAGING | Facility: CLINIC | Age: 59
End: 2019-02-15
Payer: COMMERCIAL

## 2019-02-15 DIAGNOSIS — D86.9 SARCOIDOSIS, UNSPECIFIED: ICD-10-CM

## 2019-02-15 DIAGNOSIS — R91.8 OTHER NONSPECIFIC ABNORMAL FINDING OF LUNG FIELD: ICD-10-CM

## 2019-02-15 DIAGNOSIS — Z98.89 OTHER SPECIFIED POSTPROCEDURAL STATES: Chronic | ICD-10-CM

## 2019-02-15 PROCEDURE — 71250 CT THORAX DX C-: CPT | Mod: 26

## 2019-02-15 PROCEDURE — 71250 CT THORAX DX C-: CPT

## 2019-02-21 ENCOUNTER — MESSAGE (OUTPATIENT)
Age: 59
End: 2019-02-21

## 2019-02-25 ENCOUNTER — MESSAGE (OUTPATIENT)
Age: 59
End: 2019-02-25

## 2019-03-07 ENCOUNTER — APPOINTMENT (OUTPATIENT)
Dept: FAMILY MEDICINE | Facility: CLINIC | Age: 59
End: 2019-03-07

## 2019-03-12 ENCOUNTER — APPOINTMENT (OUTPATIENT)
Dept: PULMONOLOGY | Facility: CLINIC | Age: 59
End: 2019-03-12
Payer: COMMERCIAL

## 2019-03-12 VITALS — WEIGHT: 155 LBS | SYSTOLIC BLOOD PRESSURE: 120 MMHG | DIASTOLIC BLOOD PRESSURE: 80 MMHG | BODY MASS INDEX: 29.29 KG/M2

## 2019-03-12 VITALS — OXYGEN SATURATION: 96 % | HEART RATE: 95 BPM

## 2019-03-12 PROCEDURE — 99214 OFFICE O/P EST MOD 30 MIN: CPT

## 2019-03-12 NOTE — DISCUSSION/SUMMARY
[FreeTextEntry1] : Sarcoidosis radiographic stage II, \par complicated by moderate persistent asthma, slowy improving cough and GERD\par exam without bronchospasm, normal sp02, \par CT scan with mild progression of sarcoid parenchymal infiltrates from 2016\par she could not tolerate low dose prednisone\par overall better, could not stay for PFts today\par continue Breo \par will also treat GERD ( pepcid) and Rhinitis ( non sedating anti histamine - no D)\par ENT evaluation discussed\par cardiac MRI negative in past\par if sarcoid progresses and intolerant of prednisone will obtain tertiary care center input re alternative suppressive therapy\par 4- 6 weeks with bhanu or sooner if needed, to call if no improvement

## 2019-03-12 NOTE — CONSULT LETTER
[Dear  ___] : Dear  [unfilled], [Consult Letter:] : I had the pleasure of evaluating your patient, [unfilled]. [Please see my note below.] : Please see my note below. [Consult Closing:] : Thank you very much for allowing me to participate in the care of this patient.  If you have any questions, please do not hesitate to contact me. [Sincerely,] : Sincerely, [[facility], [facility]] : [facility], [facility] [Director, Respiratory Care] : Director, Respiratory Care [[facility]] : [facility] [FreeTextEntry3] : [facility]onel Jefferson DO [facility]\par Pulmonary Critical Care\par Director Pulmonary Division\par Medical Director Respiratory Therapy\par [facility]\par \par

## 2019-03-12 NOTE — HISTORY OF PRESENT ILLNESS
[FreeTextEntry1] : cough better, not resolved \par no fever, chill, chest pain\par Breo daily,\par no sputum\par has sig reflux\par dyspnea improved \par Cardiac work up negative\par

## 2019-04-25 ENCOUNTER — OUTPATIENT (OUTPATIENT)
Dept: OUTPATIENT SERVICES | Facility: HOSPITAL | Age: 59
LOS: 1 days | End: 2019-04-25
Payer: COMMERCIAL

## 2019-04-25 DIAGNOSIS — R13.10 DYSPHAGIA, UNSPECIFIED: ICD-10-CM

## 2019-04-25 DIAGNOSIS — Z98.89 OTHER SPECIFIED POSTPROCEDURAL STATES: Chronic | ICD-10-CM

## 2019-04-25 PROCEDURE — 74241: CPT

## 2019-04-25 PROCEDURE — 74241: CPT | Mod: 26

## 2019-05-07 ENCOUNTER — APPOINTMENT (OUTPATIENT)
Dept: THORACIC SURGERY | Facility: CLINIC | Age: 59
End: 2019-05-07

## 2019-05-14 ENCOUNTER — APPOINTMENT (OUTPATIENT)
Dept: THORACIC SURGERY | Facility: CLINIC | Age: 59
End: 2019-05-14
Payer: COMMERCIAL

## 2019-05-14 VITALS
WEIGHT: 153 LBS | SYSTOLIC BLOOD PRESSURE: 156 MMHG | DIASTOLIC BLOOD PRESSURE: 104 MMHG | TEMPERATURE: 98.3 F | HEIGHT: 61 IN | HEART RATE: 57 BPM | BODY MASS INDEX: 28.89 KG/M2 | OXYGEN SATURATION: 98 %

## 2019-05-14 PROCEDURE — 99204 OFFICE O/P NEW MOD 45 MIN: CPT

## 2019-05-14 NOTE — DATA REVIEWED
[FreeTextEntry1] : Esophagram above\par \par CT chest shows extensive b/l infiltrates and adenopathy

## 2019-05-14 NOTE — HISTORY OF PRESENT ILLNESS
[FreeTextEntry1] : \norberto Owen was in the office today for a follow up visit.  She underwent bronchoscopy in 2014 for sarcoidosis.  She presents now with chronic persistent dry cough present for the past year but worsening over the past few months.  A recent esophagram showed reflux with lying flat and a small hiatal hernia.  SHe does describe occasional nausea after oral intake.

## 2019-05-14 NOTE — PHYSICAL EXAM
[General Appearance - Alert] : alert [General Appearance - In No Acute Distress] : in no acute distress [Neck Appearance] : the appearance of the neck was normal [Neck Cervical Mass (___cm)] : no neck mass was observed [Jugular Venous Distention Increased] : there was no jugular-venous distention [Thyroid Diffuse Enlargement] : the thyroid was not enlarged [Thyroid Nodule] : there were no palpable thyroid nodules [Auscultation Breath Sounds / Voice Sounds] : lungs were clear to auscultation bilaterally [Heart Sounds] : normal S1 and S2 [Heart Rate And Rhythm] : heart rate was normal and rhythm regular [Heart Sounds Gallop] : no gallops [Murmurs] : no murmurs [Heart Sounds Pericardial Friction Rub] : no pericardial rub [Examination Of The Chest] : the chest was normal in appearance [Diminished Respiratory Excursion] : normal chest expansion [Chest Visual Inspection Thoracic Asymmetry] : no chest asymmetry [Bowel Sounds] : normal bowel sounds [Abdomen Soft] : soft [Abdomen Tenderness] : non-tender [] : no hepato-splenomegaly [Abdomen Mass (___ Cm)] : no abdominal mass palpated [Cervical Lymph Nodes Enlarged Posterior Bilaterally] : posterior cervical [Cervical Lymph Nodes Enlarged Anterior Bilaterally] : anterior cervical [No Spinal Tenderness] : no spinal tenderness [No CVA Tenderness] : no ~M costovertebral angle tenderness [Nail Clubbing] : no clubbing  or cyanosis of the fingernails [Motor Tone] : muscle strength and tone were normal [No Focal Deficits] : no focal deficits [Oriented To Time, Place, And Person] : oriented to person, place, and time [Impaired Insight] : insight and judgment were intact [Affect] : the affect was normal

## 2019-05-14 NOTE — ASSESSMENT
[FreeTextEntry1] : Lexie is a 58 year old female with untreated sarcoidosis secondary to steroid intolerance who presents with a progressive cough.  A recent esophagram showed positional reflux.  She recently began taking PPI but admits to rarely taking them.  The etiology of her symptoms remains unclear.  I have asked her to regularly take her PPI and follow up in 2 weeks to reassess.  In addition I will be contacting pulmonary for further discussion regarding her sarcoidosis.\par \par Thank you for allowing me to participate in the care of your patient.\par \par Billy Acosta MD\par Department of Cardiovascular and Thoracic Surgery\par \par [facility]\par

## 2019-05-17 RX ORDER — FLUTICASONE FUROATE AND VILANTEROL TRIFENATATE 200; 25 UG/1; UG/1
200-25 POWDER RESPIRATORY (INHALATION) DAILY
Qty: 3 | Refills: 3 | Status: DISCONTINUED | COMMUNITY
Start: 2018-03-19 | End: 2019-05-17

## 2019-05-21 ENCOUNTER — APPOINTMENT (OUTPATIENT)
Dept: CARDIOLOGY | Facility: CLINIC | Age: 59
End: 2019-05-21

## 2019-05-27 ENCOUNTER — FORM ENCOUNTER (OUTPATIENT)
Age: 59
End: 2019-05-27

## 2019-05-28 ENCOUNTER — TRANSCRIPTION ENCOUNTER (OUTPATIENT)
Age: 59
End: 2019-05-28

## 2019-05-28 ENCOUNTER — OUTPATIENT (OUTPATIENT)
Dept: OUTPATIENT SERVICES | Facility: HOSPITAL | Age: 59
LOS: 1 days | End: 2019-05-28
Payer: COMMERCIAL

## 2019-05-28 ENCOUNTER — APPOINTMENT (OUTPATIENT)
Dept: MAMMOGRAPHY | Facility: CLINIC | Age: 59
End: 2019-05-28
Payer: COMMERCIAL

## 2019-05-28 DIAGNOSIS — Z12.31 ENCOUNTER FOR SCREENING MAMMOGRAM FOR MALIGNANT NEOPLASM OF BREAST: ICD-10-CM

## 2019-05-28 DIAGNOSIS — Z98.89 OTHER SPECIFIED POSTPROCEDURAL STATES: Chronic | ICD-10-CM

## 2019-05-28 PROCEDURE — 77067 SCR MAMMO BI INCL CAD: CPT | Mod: 26

## 2019-05-28 PROCEDURE — 77067 SCR MAMMO BI INCL CAD: CPT

## 2019-05-28 PROCEDURE — 77063 BREAST TOMOSYNTHESIS BI: CPT | Mod: 26

## 2019-05-28 PROCEDURE — 77063 BREAST TOMOSYNTHESIS BI: CPT

## 2019-06-04 ENCOUNTER — APPOINTMENT (OUTPATIENT)
Dept: CARDIOLOGY | Facility: CLINIC | Age: 59
End: 2019-06-04

## 2019-06-11 ENCOUNTER — APPOINTMENT (OUTPATIENT)
Dept: THORACIC SURGERY | Facility: CLINIC | Age: 59
End: 2019-06-11

## 2019-06-11 ENCOUNTER — APPOINTMENT (OUTPATIENT)
Dept: PULMONOLOGY | Facility: CLINIC | Age: 59
End: 2019-06-11

## 2019-07-23 ENCOUNTER — APPOINTMENT (OUTPATIENT)
Dept: CARDIOLOGY | Facility: CLINIC | Age: 59
End: 2019-07-23

## 2019-09-06 ENCOUNTER — TRANSCRIPTION ENCOUNTER (OUTPATIENT)
Age: 59
End: 2019-09-06

## 2019-09-10 ENCOUNTER — APPOINTMENT (OUTPATIENT)
Dept: THORACIC SURGERY | Facility: CLINIC | Age: 59
End: 2019-09-10
Payer: MEDICAID

## 2019-09-10 VITALS
HEIGHT: 61 IN | DIASTOLIC BLOOD PRESSURE: 107 MMHG | WEIGHT: 135.01 LBS | HEART RATE: 103 BPM | OXYGEN SATURATION: 93 % | BODY MASS INDEX: 25.49 KG/M2 | SYSTOLIC BLOOD PRESSURE: 177 MMHG

## 2019-09-10 PROCEDURE — 99213 OFFICE O/P EST LOW 20 MIN: CPT

## 2019-09-11 NOTE — ASSESSMENT
[FreeTextEntry1] : Lexie is a 59-year-old female with a primary complaint of cough which is intermittent without exacerbating factors. She does have documented reflux by esophagram what it is unclear whether she has intestinal dysmotility and reflux is her pop-off valve. Once again asked her followup with pulmonary medicine and gastroenterology for further evaluation if she may require a repeat endoscopy. Further recommendations will be made after those evaluations are completed.\par \par Thank you for allowing me to participate in the care of your patient.\par \par Billy Acosta MD\par Department of Cardiovascular and Thoracic Surgery\par \par [facility] hospitals/[facility]\par

## 2019-09-11 NOTE — PHYSICAL EXAM
[Neck Appearance] : the appearance of the neck was normal [Neck Cervical Mass (___cm)] : no neck mass was observed [Jugular Venous Distention Increased] : there was no jugular-venous distention [Thyroid Diffuse Enlargement] : the thyroid was not enlarged [Thyroid Nodule] : there were no palpable thyroid nodules [Auscultation Breath Sounds / Voice Sounds] : lungs were clear to auscultation bilaterally [Heart Rate And Rhythm] : heart rate was normal and rhythm regular [Heart Sounds] : normal S1 and S2 [Heart Sounds Gallop] : no gallops [Murmurs] : no murmurs [Heart Sounds Pericardial Friction Rub] : no pericardial rub [Examination Of The Chest] : the chest was normal in appearance [Chest Visual Inspection Thoracic Asymmetry] : no chest asymmetry [Diminished Respiratory Excursion] : normal chest expansion [Bowel Sounds] : normal bowel sounds [Abdomen Soft] : soft [Abdomen Tenderness] : non-tender [] : no hepato-splenomegaly [Abdomen Mass (___ Cm)] : no abdominal mass palpated [Cervical Lymph Nodes Enlarged Posterior Bilaterally] : posterior cervical [Cervical Lymph Nodes Enlarged Anterior Bilaterally] : anterior cervical [No Focal Deficits] : no focal deficits [Oriented To Time, Place, And Person] : oriented to person, place, and time [Impaired Insight] : insight and judgment were intact [Affect] : the affect was normal

## 2019-09-11 NOTE — HISTORY OF PRESENT ILLNESS
[FreeTextEntry1] : \norberto Owen was in the office today for a followup visit. She was previously seen for reflux and was asked to followup with pulmonary medicine and gastroenterology for further evaluation but has not done so to this point. She does have a history of untreated sarcoidosis. Her primary complaint at this point his cough which occurs intermittently without specific exacerbating factors. She does describe reflux at times but this does not appear to be associated with her cough. She has intentionally lost weight since her last visit.

## 2019-10-01 RX ORDER — FLUTICASONE PROPIONATE AND SALMETEROL 50; 250 UG/1; UG/1
250-50 POWDER RESPIRATORY (INHALATION)
Qty: 3 | Refills: 2 | Status: DISCONTINUED | COMMUNITY
Start: 2019-05-17 | End: 2019-10-01

## 2019-10-03 LAB
BASOPHILS # BLD AUTO: 0.02 K/UL
BASOPHILS NFR BLD AUTO: 0.4 %
EOSINOPHIL # BLD AUTO: 0.1 K/UL
EOSINOPHIL NFR BLD AUTO: 2.2 %
HCT VFR BLD CALC: 38.6 %
HGB BLD-MCNC: 12.4 G/DL
IMM GRANULOCYTES NFR BLD AUTO: 0 %
LYMPHOCYTES # BLD AUTO: 0.29 K/UL
LYMPHOCYTES NFR BLD AUTO: 6.5 %
MAN DIFF?: NORMAL
MCHC RBC-ENTMCNC: 29 PG
MCHC RBC-ENTMCNC: 32.1 GM/DL
MCV RBC AUTO: 90.2 FL
MONOCYTES # BLD AUTO: 0.46 K/UL
MONOCYTES NFR BLD AUTO: 10.3 %
NEUTROPHILS # BLD AUTO: 3.58 K/UL
NEUTROPHILS NFR BLD AUTO: 80.6 %
PLATELET # BLD AUTO: 269 K/UL
RBC # BLD: 4.28 M/UL
RBC # FLD: 14.5 %
WBC # FLD AUTO: 4.45 K/UL

## 2019-10-08 ENCOUNTER — NON-APPOINTMENT (OUTPATIENT)
Age: 59
End: 2019-10-08

## 2019-10-08 ENCOUNTER — APPOINTMENT (OUTPATIENT)
Dept: CARDIOLOGY | Facility: CLINIC | Age: 59
End: 2019-10-08
Payer: MEDICAID

## 2019-10-08 VITALS
HEIGHT: 61 IN | WEIGHT: 133 LBS | SYSTOLIC BLOOD PRESSURE: 124 MMHG | HEART RATE: 87 BPM | BODY MASS INDEX: 25.11 KG/M2 | OXYGEN SATURATION: 98 % | DIASTOLIC BLOOD PRESSURE: 80 MMHG

## 2019-10-08 DIAGNOSIS — R53.82 CHRONIC FATIGUE, UNSPECIFIED: ICD-10-CM

## 2019-10-08 PROCEDURE — 93000 ELECTROCARDIOGRAM COMPLETE: CPT

## 2019-10-08 PROCEDURE — 99214 OFFICE O/P EST MOD 30 MIN: CPT

## 2019-10-08 RX ORDER — PREDNISONE 10 MG/1
10 TABLET ORAL DAILY
Qty: 30 | Refills: 5 | Status: DISCONTINUED | COMMUNITY
Start: 2019-02-25 | End: 2019-10-08

## 2019-10-08 RX ORDER — FAMOTIDINE 40 MG/1
40 TABLET, FILM COATED ORAL DAILY
Qty: 60 | Refills: 1 | Status: DISCONTINUED | COMMUNITY
Start: 2019-01-16 | End: 2019-10-08

## 2019-10-14 ENCOUNTER — APPOINTMENT (OUTPATIENT)
Dept: FAMILY MEDICINE | Facility: CLINIC | Age: 59
End: 2019-10-14
Payer: MEDICAID

## 2019-10-14 VITALS
HEIGHT: 61 IN | WEIGHT: 133 LBS | BODY MASS INDEX: 25.11 KG/M2 | DIASTOLIC BLOOD PRESSURE: 110 MMHG | SYSTOLIC BLOOD PRESSURE: 150 MMHG | HEART RATE: 100 BPM

## 2019-10-14 PROCEDURE — 99214 OFFICE O/P EST MOD 30 MIN: CPT

## 2019-10-15 ENCOUNTER — APPOINTMENT (OUTPATIENT)
Dept: PULMONOLOGY | Facility: CLINIC | Age: 59
End: 2019-10-15
Payer: MEDICAID

## 2019-10-15 VITALS
WEIGHT: 133 LBS | HEIGHT: 60 IN | SYSTOLIC BLOOD PRESSURE: 118 MMHG | OXYGEN SATURATION: 99 % | DIASTOLIC BLOOD PRESSURE: 78 MMHG | HEART RATE: 93 BPM | BODY MASS INDEX: 26.11 KG/M2

## 2019-10-15 PROCEDURE — 99215 OFFICE O/P EST HI 40 MIN: CPT | Mod: 25

## 2019-10-15 PROCEDURE — 94010 BREATHING CAPACITY TEST: CPT

## 2019-10-15 RX ORDER — ALBUTEROL SULFATE 90 UG/1
108 (90 BASE) AEROSOL, METERED RESPIRATORY (INHALATION)
Qty: 1 | Refills: 5 | Status: DISCONTINUED | COMMUNITY
Start: 2019-01-16 | End: 2019-10-15

## 2019-10-15 NOTE — PHYSICAL EXAM
[General Appearance - Well Nourished] : well nourished [General Appearance - Well Developed] : well developed [Normal Appearance] : normal appearance [No Deformities] : no deformities [Neck Appearance] : the appearance of the neck was normal [Murmurs] : no murmurs present [Heart Sounds] : normal S1 and S2 [Heart Rate And Rhythm] : heart rate and rhythm were normal [Edema] : no peripheral edema present [Respiration, Rhythm And Depth] : normal respiratory rhythm and effort [Auscultation Breath Sounds / Voice Sounds] : lungs were clear to auscultation bilaterally [Lungs Percussion] : the lungs were normal to percussion [Abnormal Walk] : normal gait [Cyanosis, Localized] : no localized cyanosis [Nail Clubbing] : no clubbing of the fingernails [No Focal Deficits] : no focal deficits [] : no rash [Oriented To Time, Place, And Person] : oriented to person, place, and time [Impaired Insight] : insight and judgment were intact [Affect] : the affect was normal [Memory Recent] : recent memory was not impaired [FreeTextEntry1] : No chest wall abnormality

## 2019-10-15 NOTE — CONSULT LETTER
[Dear  ___] : Dear  [unfilled], [Consult Closing:] : Thank you very much for allowing me to participate in the care of this patient.  If you have any questions, please do not hesitate to contact me. [Please see my note below.] : Please see my note below. [Consult Letter:] : I had the pleasure of evaluating your patient, [unfilled]. [[facility]onel Jefferson DO, [facility]] : Leonel Jefferson DO, [facility] [Sincerely,] : Sincerely, [Director, Respiratory Care] : Director, Respiratory Care [[facility]] : [facility] [FreeTextEntry3] : [facility]onel Jefferson DO [facility]\par Pulmonary Critical Care\par Director Pulmonary Division\par Medical Director Respiratory Therapy\par [facility]\par \par  [DrPerico  ___] : Dr. JEFFERSON

## 2019-10-15 NOTE — REVIEW OF SYSTEMS
[As Noted in HPI] : as noted in HPI [Negative] : Musculoskeletal [FreeTextEntry9] : cardiac MRI negative, had syncope, BOP meds adjusted [FreeTextEntry7] : cramping

## 2019-10-15 NOTE — DISCUSSION/SUMMARY
[FreeTextEntry1] : Sarcoidosis radiographic stage II, with chronic cough, GERD , nausea , vomiting with food by hx\par complicated by moderate persistent asthma, stable on spirometry today, despite lack of Breo c/w remodelling component\par exam without bronchospasm, normal sp02, \par GI work up planned, Cardio and T surgery notes appreciated\par she could not tolerate low dose prednisone\par Will repeat CT scan for any parenchymal sarcoid involvement\par continue Breo \par will also treat GERD ( pepcid) and Rhinitis ( non sedating anti histamine - no D)\par ENT evaluation negative per pt\par cardiac MRI negative in past\par may also benefit from airways exam for endobronchial sarcoid progression pending CT scan\par 4- months or sooner if needed, vaccinations this fall, will call with CT scan

## 2019-10-15 NOTE — HISTORY OF PRESENT ILLNESS
[FreeTextEntry1] : cough remains intertmittent\par saw ENT and GI\par has reflux, but denies improvement post ppi\par now off all medications, was on Breo inpast\par no fever, chill, chest pain\par no sputum\par has sig reflux\par dyspnea at baseline\par recently saw Cardiology and T surgery\par Cardiac work up negative for Sarcoid 2015,MRI\par

## 2019-10-15 NOTE — PROCEDURE
[FreeTextEntry1] : spirometry with moderate air flow obstruction, no sig change from 2018\par recent labs, normal LFTs and calcium

## 2019-10-16 RX ORDER — FLUTICASONE FUROATE AND VILANTEROL TRIFENATATE 100; 25 UG/1; UG/1
100-25 POWDER RESPIRATORY (INHALATION)
Qty: 3 | Refills: 3 | Status: DISCONTINUED | COMMUNITY
Start: 2019-10-15 | End: 2019-10-16

## 2019-10-16 RX ORDER — FLUTICASONE PROPIONATE AND SALMETEROL 50; 250 UG/1; UG/1
250-50 POWDER RESPIRATORY (INHALATION)
Qty: 3 | Refills: 2 | Status: DISCONTINUED | COMMUNITY
Start: 2019-10-16 | End: 2019-10-16

## 2019-10-16 NOTE — PHYSICAL EXAM
[No Acute Distress] : no acute distress [Supple] : supple [Normal Sclera/Conjunctiva] : normal sclera/conjunctiva [No Respiratory Distress] : no respiratory distress  [Clear to Auscultation] : lungs were clear to auscultation bilaterally [Regular Rhythm] : with a regular rhythm [Normal S1, S2] : normal S1 and S2 [Normal Appearance] : normal in appearance [No Masses] : no palpable masses [Soft] : abdomen soft [Non Tender] : non-tender [Alert and Oriented x3] : oriented to person, place, and time [Normal Gait] : normal gait [No Spinal Tenderness] : no spinal tenderness [Normal Insight/Judgement] : insight and judgment were intact [de-identified] : calm and negaging  [de-identified] : MARY  [de-identified] : tender to even gently palpation RIGHT BREAST  ^ o'clock to 10    no discrete palapbel mass no erythema

## 2019-10-16 NOTE — ASSESSMENT
[FreeTextEntry1] : US of breast ordered.\par \par Advised to restart BP mediation and FU in 2-3 weeks.

## 2019-10-16 NOTE — HISTORY OF PRESENT ILLNESS
[FreeTextEntry8] : pt in office for pain on her right breast for 2 weeks. \par \par Denies any   trauma or injury\par No palpable  mass felt. \par Pain does not wake her from sleep \par Last Mammo May 2019 Bi Rads 2 \par \par HTN elevated today   has been off  medications for a few months. \par \par COUGH /vomiting episodes  Follows with GI\par Endoscopy planned \par \par Social: coninues legal [place] with ex  ad finances; works in restaurant. \par

## 2019-10-17 ENCOUNTER — RX RENEWAL (OUTPATIENT)
Age: 59
End: 2019-10-17

## 2019-10-17 RX ORDER — SALMETEROL XINAFOATE 50 UG/1
50 POWDER, METERED ORAL; RESPIRATORY (INHALATION)
Qty: 1 | Refills: 1 | Status: DISCONTINUED | COMMUNITY
Start: 2019-10-17 | End: 2019-10-17

## 2019-10-17 RX ORDER — BECLOMETHASONE DIPROPIONATE HFA 80 UG/1
80 AEROSOL, METERED RESPIRATORY (INHALATION) TWICE DAILY
Qty: 1 | Refills: 5 | Status: DISCONTINUED | COMMUNITY
Start: 2019-10-17 | End: 2019-10-17

## 2019-10-21 ENCOUNTER — FORM ENCOUNTER (OUTPATIENT)
Age: 59
End: 2019-10-21

## 2019-10-22 ENCOUNTER — APPOINTMENT (OUTPATIENT)
Dept: CT IMAGING | Facility: CLINIC | Age: 59
End: 2019-10-22
Payer: MEDICAID

## 2019-10-22 ENCOUNTER — OUTPATIENT (OUTPATIENT)
Dept: OUTPATIENT SERVICES | Facility: HOSPITAL | Age: 59
LOS: 1 days | End: 2019-10-22
Payer: MEDICAID

## 2019-10-22 DIAGNOSIS — Z00.00 ENCOUNTER FOR GENERAL ADULT MEDICAL EXAMINATION WITHOUT ABNORMAL FINDINGS: ICD-10-CM

## 2019-10-22 DIAGNOSIS — Z98.89 OTHER SPECIFIED POSTPROCEDURAL STATES: Chronic | ICD-10-CM

## 2019-10-22 DIAGNOSIS — R91.8 OTHER NONSPECIFIC ABNORMAL FINDING OF LUNG FIELD: ICD-10-CM

## 2019-10-22 PROCEDURE — 71250 CT THORAX DX C-: CPT

## 2019-10-22 PROCEDURE — 71250 CT THORAX DX C-: CPT | Mod: 26

## 2019-10-23 ENCOUNTER — RX RENEWAL (OUTPATIENT)
Age: 59
End: 2019-10-23

## 2019-10-28 ENCOUNTER — APPOINTMENT (OUTPATIENT)
Dept: FAMILY MEDICINE | Facility: CLINIC | Age: 59
End: 2019-10-28
Payer: MEDICAID

## 2019-10-28 ENCOUNTER — FORM ENCOUNTER (OUTPATIENT)
Age: 59
End: 2019-10-28

## 2019-10-28 VITALS
BODY MASS INDEX: 26.7 KG/M2 | DIASTOLIC BLOOD PRESSURE: 78 MMHG | HEART RATE: 88 BPM | HEIGHT: 60 IN | SYSTOLIC BLOOD PRESSURE: 122 MMHG | WEIGHT: 136 LBS

## 2019-10-28 PROCEDURE — 90686 IIV4 VACC NO PRSV 0.5 ML IM: CPT

## 2019-10-28 PROCEDURE — 99214 OFFICE O/P EST MOD 30 MIN: CPT | Mod: 25

## 2019-10-28 PROCEDURE — 90471 IMMUNIZATION ADMIN: CPT

## 2019-10-29 ENCOUNTER — OUTPATIENT (OUTPATIENT)
Dept: OUTPATIENT SERVICES | Facility: HOSPITAL | Age: 59
LOS: 1 days | End: 2019-10-29
Payer: MEDICAID

## 2019-10-29 ENCOUNTER — APPOINTMENT (OUTPATIENT)
Dept: ULTRASOUND IMAGING | Facility: CLINIC | Age: 59
End: 2019-10-29
Payer: MEDICAID

## 2019-10-29 DIAGNOSIS — N64.4 MASTODYNIA: ICD-10-CM

## 2019-10-29 DIAGNOSIS — Z98.89 OTHER SPECIFIED POSTPROCEDURAL STATES: Chronic | ICD-10-CM

## 2019-10-29 PROCEDURE — 76641 ULTRASOUND BREAST COMPLETE: CPT | Mod: 26,RT

## 2019-10-29 PROCEDURE — 76641 ULTRASOUND BREAST COMPLETE: CPT

## 2019-10-30 NOTE — ASSESSMENT
[FreeTextEntry1] : HTN  well controlled! \par Continue dietary discretion and exercise.\par \par FU for US of breast  \par \par

## 2019-10-30 NOTE — PHYSICAL EXAM
[No Acute Distress] : no acute distress [Normal Sclera/Conjunctiva] : normal sclera/conjunctiva [Supple] : supple [No Respiratory Distress] : no respiratory distress  [Regular Rhythm] : with a regular rhythm [Normal S1, S2] : normal S1 and S2 [Normal Appearance] : normal in appearance [No Masses] : no palpable masses [No Spinal Tenderness] : no spinal tenderness [Normal Gait] : normal gait [Alert and Oriented x3] : oriented to person, place, and time [Normal Insight/Judgement] : insight and judgment were intact [No Accessory Muscle Use] : no accessory muscle use [de-identified] : calm and engaging  [de-identified] : MARY

## 2019-10-30 NOTE — HISTORY OF PRESENT ILLNESS
[FreeTextEntry1] : FU on BP after restarting Valsartan 2 weeks ago.  [de-identified] : Today presents for BP check and Flu Vac.\par Last seen acutely 10/14 and BP elevated.\par US of Breast  tomorrow.  Pain " a bit better"  [FreeTextEntry8] : In review: 10/14\par \par pt in office for pain on her right breast for 2 weeks. \par \par Denies any   trauma or injury\par No palpable  mass felt. \par Pain does not wake her from sleep \par Last Mammo May 2019 Bi Rads 2 \par \par HTN elevated today   has been off  medications for a few months. \par \par COUGH /vomiting episodes  Follows with GI\par Endoscopy planned \par \par Social: coninues legal [place] with ex  ad finances; works in restaurant. \par

## 2019-11-04 ENCOUNTER — APPOINTMENT (OUTPATIENT)
Dept: THORACIC SURGERY | Facility: CLINIC | Age: 59
End: 2019-11-04
Payer: MEDICAID

## 2019-11-04 VITALS
DIASTOLIC BLOOD PRESSURE: 87 MMHG | OXYGEN SATURATION: 97 % | SYSTOLIC BLOOD PRESSURE: 142 MMHG | RESPIRATION RATE: 16 BRPM | HEART RATE: 88 BPM

## 2019-11-04 PROCEDURE — 99213 OFFICE O/P EST LOW 20 MIN: CPT

## 2019-11-04 RX ORDER — FLUTICASONE PROPIONATE AND SALMETEROL 250; 50 UG/1; UG/1
250-50 POWDER RESPIRATORY (INHALATION)
Qty: 1 | Refills: 5 | Status: COMPLETED | COMMUNITY
Start: 2019-10-23 | End: 2019-11-04

## 2019-11-04 NOTE — ASSESSMENT
[FreeTextEntry1] : Lexie is a 59-year-old female with a very small hiatal hernia but reflux symptoms in association with severe sarcoidosis of the chest. She does have a chronic cough which may be related to her pulmonary disease.  I will follow up with GI in the very near future but a discussion certainly has to be had regarding antireflux surgery. In the meantime I have asked her to obtain esophageal manometry and pH testing to better assess her function.\par \par Thank you for allowing me to participate in the care of your patient.\par \par Billy Acosta MD\par Department of Cardiovascular and Thoracic Surgery\par \par Tavo and Kellie Yee\[facility]\par

## 2019-11-04 NOTE — HISTORY OF PRESENT ILLNESS
[FreeTextEntry1] : \norberto Owen was in the office today for a followup visit. She recently underwent a repeat endoscopy the results of the bowel at this time. A recent CT scan showed stable changes with bilateral dense pulmonary infiltrates.She does continue to have reflux symptoms and what appears to be an unrelated cough.

## 2019-11-04 NOTE — PHYSICAL EXAM
[Auscultation Breath Sounds / Voice Sounds] : lungs were clear to auscultation bilaterally [Heart Rate And Rhythm] : heart rate was normal and rhythm regular [Heart Sounds] : normal S1 and S2 [Heart Sounds Gallop] : no gallops [Murmurs] : no murmurs [Heart Sounds Pericardial Friction Rub] : no pericardial rub [Examination Of The Chest] : the chest was normal in appearance [Chest Visual Inspection Thoracic Asymmetry] : no chest asymmetry [Diminished Respiratory Excursion] : normal chest expansion [Bowel Sounds] : normal bowel sounds [Abdomen Soft] : soft [Abdomen Tenderness] : non-tender [] : no hepato-splenomegaly [Abdomen Mass (___ Cm)] : no abdominal mass palpated [No Focal Deficits] : no focal deficits [Oriented To Time, Place, And Person] : oriented to person, place, and time [Impaired Insight] : insight and judgment were intact [Affect] : the affect was normal

## 2020-01-14 ENCOUNTER — RESULT REVIEW (OUTPATIENT)
Age: 60
End: 2020-01-14

## 2020-02-11 ENCOUNTER — APPOINTMENT (OUTPATIENT)
Dept: PULMONOLOGY | Facility: CLINIC | Age: 60
End: 2020-02-11
Payer: MEDICAID

## 2020-02-11 DIAGNOSIS — Z87.19 PERSONAL HISTORY OF OTHER DISEASES OF THE DIGESTIVE SYSTEM: ICD-10-CM

## 2020-02-11 PROCEDURE — 99215 OFFICE O/P EST HI 40 MIN: CPT

## 2020-02-11 NOTE — HISTORY OF PRESENT ILLNESS
[TextBox_4] : cough remains intermittent\par saw GI had endoscopy\par has reflux, but denies improvement post ppi\par now off all medications, was on Breo in past, now Wixella\par no fever, chill, chest pain\par no sputum\par has sig reflux\par dyspnea at baseline\par recently saw Cardiology and T surgery\par Cardiac work up negative for Sarcoid 2015,MRI [FreeTextEntry1] : \par

## 2020-02-11 NOTE — PROCEDURE
[FreeTextEntry1] : spirometry with moderate air flow obstruction, no sig change from 2018\par recent labs, normal LFTs and calcium\par CT 10/19: stable sarcoid, no change from 2019

## 2020-02-11 NOTE — PHYSICAL EXAM
[General Appearance - Well Developed] : well developed [General Appearance - Well Nourished] : well nourished [Normal Appearance] : normal appearance [Neck Appearance] : the appearance of the neck was normal [No Deformities] : no deformities [Heart Rate And Rhythm] : heart rate and rhythm were normal [Heart Sounds] : normal S1 and S2 [Edema] : no peripheral edema present [Murmurs] : no murmurs present [Auscultation Breath Sounds / Voice Sounds] : lungs were clear to auscultation bilaterally [Respiration, Rhythm And Depth] : normal respiratory rhythm and effort [Lungs Percussion] : the lungs were normal to percussion [Abnormal Walk] : normal gait [Cyanosis, Localized] : no localized cyanosis [Nail Clubbing] : no clubbing of the fingernails [] : no rash [No Focal Deficits] : no focal deficits [Affect] : the affect was normal [Oriented To Time, Place, And Person] : oriented to person, place, and time [Impaired Insight] : insight and judgment were intact [Memory Recent] : recent memory was not impaired [FreeTextEntry1] : No chest wall abnormality

## 2020-02-11 NOTE — DISCUSSION/SUMMARY
[FreeTextEntry1] : Sarcoidosis radiographic stage II, with chronic cough, GERD , nausea , vomiting with food by hx\par complicated by moderate persistent asthma, stable on spirometry today, asthma  c/w remodelling component\par exam without bronchospasm, normal sp02, \par GI work up planned, Cardio and T surgery notes appreciated\par Recent endoscopy with mild gastritis GE junction, no Briones's \par  repeat CT scan stable  parenchymal sarcoid , spirometry stable mild obstruction\par continue Breo - samples given\par will also treat GERD ( Tagamet q HS)\par ENT evaluation negative per pt\par cardiac MRI negative in past\par may also benefit from airways exam for endobronchial sarcoid progression pending status\par 4- months or sooner if needed, with PFts

## 2020-02-11 NOTE — CONSULT LETTER
[Consult Letter:] : I had the pleasure of evaluating your patient, [unfilled]. [Dear  ___] : Dear  [unfilled], [Sincerely,] : Sincerely, [Consult Closing:] : Thank you very much for allowing me to participate in the care of this patient.  If you have any questions, please do not hesitate to contact me. [Please see my note below.] : Please see my note below. [DrPerico  ___] : Dr. JEFFERSON [[facility] Jefferson [facility]] : [facility] [Director, Respiratory Care] : Director, Respiratory Care [[facility]] : [facility] [FreeTextEntry3] : [facility]\par Pulmonary Critical Care\par Director Pulmonary Division\par Medical Director Respiratory Therapy\par [facility]\par \par

## 2020-03-05 RX ORDER — FLUTICASONE FUROATE AND VILANTEROL TRIFENATATE 200; 25 UG/1; UG/1
200-25 POWDER RESPIRATORY (INHALATION)
Qty: 3 | Refills: 1 | Status: DISCONTINUED | COMMUNITY
Start: 2019-01-16 | End: 2020-03-05

## 2020-03-25 ENCOUNTER — APPOINTMENT (OUTPATIENT)
Dept: GASTROENTEROLOGY | Facility: HOSPITAL | Age: 60
End: 2020-03-25

## 2020-06-18 ENCOUNTER — APPOINTMENT (OUTPATIENT)
Dept: FAMILY MEDICINE | Facility: CLINIC | Age: 60
End: 2020-06-18
Payer: MEDICAID

## 2020-06-18 VITALS
SYSTOLIC BLOOD PRESSURE: 140 MMHG | HEIGHT: 61 IN | BODY MASS INDEX: 28.51 KG/M2 | WEIGHT: 151 LBS | TEMPERATURE: 99.3 F | DIASTOLIC BLOOD PRESSURE: 70 MMHG | HEART RATE: 120 BPM

## 2020-06-18 PROCEDURE — 99214 OFFICE O/P EST MOD 30 MIN: CPT

## 2020-06-18 NOTE — ASSESSMENT
[FreeTextEntry1] : CAP viral vs bacterial  will continue with abxs pending culture. \par Increase fluids.  REST.  Tylenol\par FU with Pulmonology  however not urgently.

## 2020-06-18 NOTE — PHYSICAL EXAM
[Well Nourished] : well nourished [No Acute Distress] : no acute distress [Normal Sclera/Conjunctiva] : normal sclera/conjunctiva [No Lymphadenopathy] : no lymphadenopathy [No Respiratory Distress] : no respiratory distress  [No Accessory Muscle Use] : no accessory muscle use [Clear to Auscultation] : lungs were clear to auscultation bilaterally [Normal Rate] : normal rate  [Regular Rhythm] : with a regular rhythm [No Rash] : no rash [No Spinal Tenderness] : no spinal tenderness [Alert and Oriented x3] : oriented to person, place, and time [No Focal Deficits] : no focal deficits [de-identified] : a bit anxious  [de-identified] : BS decreased LLB [de-identified] : warm and dry

## 2020-06-18 NOTE — HISTORY OF PRESENT ILLNESS
[FreeTextEntry1] : pt in office for a follow up after visit to [facility] yesterday in evaluation of LEFT pain , ache at rest, sharp on deep breath .\par Drenching sweat 2 nights  ago  and "maybe a bit SOB' . \par NO COugh Low grade fever ( 99.4) \par CXR B/L Upper lobe infiltrates, L >R .   Now treated for CAP with Z perez and cefdinir and pending COVID SWAB results. \par DOES have sarcoidosis and abn. CXR and CT of  chest and follow with Pulmonogy\par \par Feels a bit better today. \par \par She has been quarantined  Since March 14 and son has done her food shopping. 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  COngratulated!

## 2020-06-24 ENCOUNTER — APPOINTMENT (OUTPATIENT)
Dept: PULMONOLOGY | Facility: CLINIC | Age: 60
End: 2020-06-24
Payer: MEDICAID

## 2020-06-24 VITALS
WEIGHT: 148 LBS | HEART RATE: 98 BPM | DIASTOLIC BLOOD PRESSURE: 80 MMHG | HEIGHT: 59 IN | SYSTOLIC BLOOD PRESSURE: 128 MMHG | OXYGEN SATURATION: 100 % | BODY MASS INDEX: 29.84 KG/M2

## 2020-06-24 PROCEDURE — 99215 OFFICE O/P EST HI 40 MIN: CPT

## 2020-06-24 RX ORDER — CIMETIDINE 200 MG/1
200 TABLET, FILM COATED ORAL TWICE DAILY
Qty: 30 | Refills: 1 | Status: DISCONTINUED | COMMUNITY
Start: 2020-02-11 | End: 2020-06-24

## 2020-06-24 NOTE — HISTORY OF PRESENT ILLNESS
[TextBox_4] : Had L sided chest pain, \par no sig sputum\par low grade temp\par [facility], Covid negative, finishing abx\par no fever, chill, chest pain\par no sputum\par now close to baseline\par recently saw Cardiology and T surgery\par Cardiac work up negative for Sarcoid 2015,MRI\par Breo daily\par has been rescuing more frequently\par on prilosec, cough improved\par no active sinus complaints\par no new exposures [FreeTextEntry1] : \par

## 2020-06-24 NOTE — DISCUSSION/SUMMARY
[FreeTextEntry1] : Fibronodular Sarcoidosis radiographic stage II, with chronic cough, GERD ,  moderate persistent asthma, \par Recent URI/Pna, treated at urgent care, CXR ? worse ANA LAURA, baseline fibronodular sarcoid\par currently close to baseline, no fevers, cough improved, no chest pain, finishing abx, Covid negative\par exam without bronchospasm, normal sp02, \par last spirometry was stable. c/w remodelled asthma- moderate  obstruction, remains on Breo\par GI work up in progress, reflux and cough better on Proton pump\par last  CT scan stable  parenchymal sarcoid , perihilar nodular masses stable\par Will repeat CXR 4 weeks, PFTs /covid testing prior in 6 weeks\par She will contact me sooner if any change in status

## 2020-06-24 NOTE — CONSULT LETTER
[Dear  ___] : Dear  [unfilled], [Consult Letter:] : I had the pleasure of evaluating your patient, [unfilled]. [Please see my note below.] : Please see my note below. [Sincerely,] : Sincerely, [Consult Closing:] : Thank you very much for allowing me to participate in the care of this patient.  If you have any questions, please do not hesitate to contact me. [[facility], [facility]] : [facility] DO, [facility] [DrPerico  ___] : Dr. JEFFERSON [[facility]] : [facility] [Director, Respiratory Care] : Director, Respiratory Care [FreeTextEntry3] : [facility]\par Pulmonary Critical Care\par Director Pulmonary Division\par Medical Director Respiratory Therapy\par [facility]\par \par

## 2020-06-24 NOTE — REASON FOR VISIT
[Follow-Up] : a follow-up visit [Asthma] : asthma [Cough] : cough [Sarcoidosis] : sarcoidosis [FreeTextEntry2] : pneumonia

## 2020-06-24 NOTE — PHYSICAL EXAM
[Normal Appearance] : normal appearance [General Appearance - Well Developed] : well developed [No Deformities] : no deformities [General Appearance - Well Nourished] : well nourished [Neck Appearance] : the appearance of the neck was normal [Heart Rate And Rhythm] : heart rate and rhythm were normal [Heart Sounds] : normal S1 and S2 [Murmurs] : no murmurs present [Edema] : no peripheral edema present [Respiration, Rhythm And Depth] : normal respiratory rhythm and effort [Auscultation Breath Sounds / Voice Sounds] : lungs were clear to auscultation bilaterally [Lungs Percussion] : the lungs were normal to percussion [Abnormal Walk] : normal gait [Cyanosis, Localized] : no localized cyanosis [Nail Clubbing] : no clubbing of the fingernails [No Focal Deficits] : no focal deficits [] : no rash [Oriented To Time, Place, And Person] : oriented to person, place, and time [Impaired Insight] : insight and judgment were intact [Affect] : the affect was normal [Memory Recent] : recent memory was not impaired [FreeTextEntry1] : No chest wall abnormality

## 2020-06-24 NOTE — PROCEDURE
[FreeTextEntry1] : spirometry with moderate air flow obstruction, no sig change from 2018\par recent labs, normal LFTs and calcium\par CT 10/19: stable sarcoid, no change from 2019\par CXR 6/20: bilat upper lobe infiltrates , appears more dense ANA LAURA from 2019

## 2020-07-22 ENCOUNTER — APPOINTMENT (OUTPATIENT)
Dept: RADIOLOGY | Facility: CLINIC | Age: 60
End: 2020-07-22

## 2020-07-22 ENCOUNTER — OUTPATIENT (OUTPATIENT)
Dept: OUTPATIENT SERVICES | Facility: HOSPITAL | Age: 60
LOS: 1 days | End: 2020-07-22
Payer: MEDICAID

## 2020-07-22 DIAGNOSIS — D86.9 SARCOIDOSIS, UNSPECIFIED: ICD-10-CM

## 2020-07-22 DIAGNOSIS — Z98.89 OTHER SPECIFIED POSTPROCEDURAL STATES: Chronic | ICD-10-CM

## 2020-07-22 DIAGNOSIS — R91.8 OTHER NONSPECIFIC ABNORMAL FINDING OF LUNG FIELD: ICD-10-CM

## 2020-07-22 DIAGNOSIS — Z00.00 ENCOUNTER FOR GENERAL ADULT MEDICAL EXAMINATION WITHOUT ABNORMAL FINDINGS: ICD-10-CM

## 2020-07-22 DIAGNOSIS — J45.40 MODERATE PERSISTENT ASTHMA, UNCOMPLICATED: ICD-10-CM

## 2020-07-22 PROCEDURE — 71046 X-RAY EXAM CHEST 2 VIEWS: CPT

## 2020-07-22 PROCEDURE — 71046 X-RAY EXAM CHEST 2 VIEWS: CPT | Mod: 26

## 2020-07-28 ENCOUNTER — APPOINTMENT (OUTPATIENT)
Dept: THORACIC SURGERY | Facility: CLINIC | Age: 60
End: 2020-07-28

## 2020-09-03 ENCOUNTER — APPOINTMENT (OUTPATIENT)
Dept: FAMILY MEDICINE | Facility: CLINIC | Age: 60
End: 2020-09-03
Payer: MEDICAID

## 2020-09-03 ENCOUNTER — LABORATORY RESULT (OUTPATIENT)
Age: 60
End: 2020-09-03

## 2020-09-03 VITALS
HEART RATE: 99 BPM | SYSTOLIC BLOOD PRESSURE: 140 MMHG | DIASTOLIC BLOOD PRESSURE: 86 MMHG | HEIGHT: 59 IN | BODY MASS INDEX: 29.03 KG/M2 | WEIGHT: 144 LBS

## 2020-09-03 DIAGNOSIS — Z23 ENCOUNTER FOR IMMUNIZATION: ICD-10-CM

## 2020-09-03 DIAGNOSIS — L98.9 DISORDER OF THE SKIN AND SUBCUTANEOUS TISSUE, UNSPECIFIED: ICD-10-CM

## 2020-09-03 DIAGNOSIS — Z12.11 ENCOUNTER FOR SCREENING FOR MALIGNANT NEOPLASM OF COLON: ICD-10-CM

## 2020-09-03 DIAGNOSIS — Z87.01 PERSONAL HISTORY OF PNEUMONIA (RECURRENT): ICD-10-CM

## 2020-09-03 DIAGNOSIS — Z01.818 ENCOUNTER FOR OTHER PREPROCEDURAL EXAMINATION: ICD-10-CM

## 2020-09-03 LAB
BILIRUB UR QL STRIP: NEGATIVE
CLARITY UR: CLEAR
COLLECTION METHOD: NORMAL
GLUCOSE UR-MCNC: NEGATIVE
HCG UR QL: 0.2 EU/DL
HGB UR QL STRIP.AUTO: ABNORMAL
KETONES UR-MCNC: NEGATIVE
LEUKOCYTE ESTERASE UR QL STRIP: ABNORMAL
NITRITE UR QL STRIP: NEGATIVE
PH UR STRIP: 5.5
PROT UR STRIP-MCNC: NEGATIVE
SP GR UR STRIP: 1.02

## 2020-09-03 PROCEDURE — 99396 PREV VISIT EST AGE 40-64: CPT | Mod: 25

## 2020-09-03 PROCEDURE — 36415 COLL VENOUS BLD VENIPUNCTURE: CPT

## 2020-09-03 PROCEDURE — 81003 URINALYSIS AUTO W/O SCOPE: CPT | Mod: QW

## 2020-09-03 RX ORDER — AZITHROMYCIN 200 MG/5ML
200 POWDER, FOR SUSPENSION ORAL
Refills: 0 | Status: DISCONTINUED | COMMUNITY
End: 2020-09-03

## 2020-09-06 NOTE — REVIEW OF SYSTEMS
[see HPI] : see HPI [Anxiety] : anxiety [Negative] : Endocrine [Fever] : no fever [Chills] : no chills [Feeling Tired] : not feeling tired [Eyesight Problems] : no eyesight problems [Dry Eyes] : no dryness of the eyes [Cough] : no cough [SOB on Exertion] : no shortness of breath during exertion [PND] : no PND [Melena] : no melena [Confused] : no confusion [Dizziness] : no dizziness [Difficulty Walking] : no difficulty walking [Sleep Disturbances] : no sleep disturbances [Change In Personality] : no personality change

## 2020-09-06 NOTE — COUNSELING
[Healthy eating counseling provided] : healthy eating [Activity counseling provided] : activity [de-identified] : the value of daily walk, 30 minutes 5 x's per week,  stressed with regard  to overall health, mental health and bone density\par

## 2020-09-06 NOTE — HEALTH RISK ASSESSMENT
[Very Good] : ~his/her~  mood as very good [Patient reported PAP Smear was normal] : Patient reported PAP Smear was normal [Patient reported mammogram was normal] : Patient reported mammogram was normal [Patient reported colonoscopy was normal] : Patient reported colonoscopy was normal [Financial] : financial [Employed] : employed [College] : College [# Of Children ___] : has [unfilled] children [] :  [Feels Safe at Home] : Feels safe at home [Fully functional (bathing, dressing, toileting, transferring, walking, feeding)] : Fully functional (bathing, dressing, toileting, transferring, walking, feeding) [Fully functional (using the telephone, shopping, preparing meals, housekeeping, doing laundry, using] : Fully functional and needs no help or supervision to perform IADLs (using the telephone, shopping, preparing meals, housekeeping, doing laundry, using transportation, managing medications and managing finances) [Smoke Detector] : smoke detector [Seat Belt] :  uses seat belt [de-identified] :   for shingles [de-identified] : Pulmonology   Cardiology   [Reports changes in hearing] : Reports no changes in hearing [Change in mental status noted] : No change in mental status noted [Guns at Home] : no guns at home [Travel to Developing Areas] : does not  travel to developing areas [MammogramDate] : 2017  [PapSmearDate] : 2/18 [BoneDensityDate] : 2015  [ColonoscopyDate] : states  2 years ago  [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

## 2020-09-06 NOTE — HISTORY OF PRESENT ILLNESS
[de-identified] : In recent year has continued RFU with Pulmonology ( sarcoidosis)  and GI GERD. 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 \par Continues to live with mother; court case with ex-   "all on hold". [FreeTextEntry1] : In review: Jan 2015 \par 54 year old [place]  female presents today to establish care being referred to me by insurance.\par She is a bit medical history significant for sarcoidosis, which was diagnosed at the age of 39.  \par Follow with Pulmonology and reports stability.  We'll request office know.  Also has a history of hypertension.\par  Recent weeks, feels upper chest discomfort with worsening cough and nasal congestion.\par No worsening SOB. No fever or chills. \par Not able to get appt. with pulmonology until next week.\par \par She describes an episode of blacking out in mid December.  She was in her kitchen standing at near counter  when she sensed blackness and woke up on the floor.  This event was unwitnessed.  One other episode in her lifetime years ago with cardiac  workup negative.\par \par Social:   from .  2 children  ( 21 and 25 )  live with him.\par attempting employment in cosmetic industry.  Mother lives in area and a support.

## 2020-09-06 NOTE — ASSESSMENT
[FreeTextEntry1] :  Well exam for 60 year old female  with PMH as stated in HPI / active list. \par \par Management : \par \par See HPI and Plan\par \par Labs in office today.   Will advise. \par \par Best wishes offered !\par

## 2020-09-06 NOTE — PHYSICAL EXAM
[General Appearance - Alert] : alert [General Appearance - In No Acute Distress] : in no acute distress [General Appearance - Well-Appearing] : healthy appearing [Sclera] : the sclera and conjunctiva were normal [PERRL With Normal Accommodation] : pupils were equal in size, round, and reactive to light [Thyroid Diffuse Enlargement] : the thyroid was not enlarged [Respiration, Rhythm And Depth] : normal respiratory rhythm and effort [Auscultation Breath Sounds / Voice Sounds] : lungs were clear to auscultation bilaterally [Edema] : there was no peripheral edema [Veins - Varicosity Changes] : there were no varicosital changes [Bowel Sounds] : normal bowel sounds [Abdomen Soft] : soft [Abdomen Mass (___ Cm)] : no abdominal mass palpated [Cervical Lymph Nodes Enlarged Posterior Bilaterally] : posterior cervical [Cervical Lymph Nodes Enlarged Anterior Bilaterally] : anterior cervical [Supraclavicular Lymph Nodes Enlarged Bilaterally] : supraclavicular [Abnormal Walk] : normal gait [Nail Clubbing] : no clubbing  or cyanosis of the fingernails [Involuntary Movements] : no involuntary movements were seen [] : no rash [Skin Turgor] : normal skin turgor [Deep Tendon Reflexes (DTR)] : deep tendon reflexes were 2+ and symmetric [No Focal Deficits] : no focal deficits [Oriented To Time, Place, And Person] : oriented to person, place, and time [Impaired Insight] : insight and judgment were intact [FreeTextEntry1] : warm and dry

## 2020-09-07 ENCOUNTER — APPOINTMENT (OUTPATIENT)
Dept: DISASTER EMERGENCY | Facility: CLINIC | Age: 60
End: 2020-09-07

## 2020-09-08 LAB — SARS-COV-2 N GENE NPH QL NAA+PROBE: NOT DETECTED

## 2020-09-10 ENCOUNTER — APPOINTMENT (OUTPATIENT)
Dept: PULMONOLOGY | Facility: CLINIC | Age: 60
End: 2020-09-10
Payer: MEDICAID

## 2020-09-10 VITALS
RESPIRATION RATE: 16 BRPM | SYSTOLIC BLOOD PRESSURE: 120 MMHG | HEIGHT: 60 IN | OXYGEN SATURATION: 98 % | BODY MASS INDEX: 27.48 KG/M2 | HEART RATE: 106 BPM | DIASTOLIC BLOOD PRESSURE: 70 MMHG | WEIGHT: 140 LBS

## 2020-09-10 VITALS — HEART RATE: 103 BPM

## 2020-09-10 DIAGNOSIS — J84.10 PULMONARY FIBROSIS, UNSPECIFIED: ICD-10-CM

## 2020-09-10 DIAGNOSIS — R91.1 SOLITARY PULMONARY NODULE: ICD-10-CM

## 2020-09-10 PROCEDURE — 94727 GAS DIL/WSHOT DETER LNG VOL: CPT

## 2020-09-10 PROCEDURE — 94729 DIFFUSING CAPACITY: CPT

## 2020-09-10 PROCEDURE — 85018 HEMOGLOBIN: CPT | Mod: QW

## 2020-09-10 PROCEDURE — 99214 OFFICE O/P EST MOD 30 MIN: CPT | Mod: 25

## 2020-09-10 PROCEDURE — 94010 BREATHING CAPACITY TEST: CPT

## 2020-09-10 RX ORDER — CEFDINIR 300 MG/1
300 CAPSULE ORAL
Refills: 0 | Status: COMPLETED | COMMUNITY
End: 2020-09-10

## 2020-09-10 RX ORDER — ZOSTER VACCINE RECOMBINANT, ADJUVANTED 50 MCG/0.5
50 KIT INTRAMUSCULAR
Qty: 1 | Refills: 1 | Status: COMPLETED | COMMUNITY
Start: 2020-09-03 | End: 2020-09-10

## 2020-09-10 NOTE — DISCUSSION/SUMMARY
[FreeTextEntry1] : Fibronodular Sarcoidosis radiographic stage II, with chronic cough, GERD ,  moderate persistent asthma, \par at  baseline, no fevers, resolved gastroenteritis episode resolved by hx, Covid negative\par exam without bronchospasm, normal sp02, \par PFts stable x 3yr, mild air flow obstruction, normal TLC and DLCO, remains on Breo\par GI follow up advised\par last  CT scan stable  parenchymal sarcoid , perihilar nodular masses stable, will repeat CT\par 6 months or sooner if needed\par She will contact me sooner if any change in status

## 2020-09-10 NOTE — HISTORY OF PRESENT ILLNESS
[FreeTextEntry1] : \par  [TextBox_4] : \par had 2-3 day episode of GI complaints, nausea, vomiting abdominal pain, some blood in stools\par Covid negative\par no fever, chill, chest pain\par no sputum\par ate today, no diarrhea \par pulmonary at  baseline\par recently saw Cardiology and T surgery\par Cardiac work up negative for Sarcoid 2015,MRI\par Breo daily\par has been rescuing more frequently\par no active sinus complaints\par no new exposures

## 2020-09-10 NOTE — CONSULT LETTER
[Dear  ___] : Dear  [unfilled], [Consult Letter:] : I had the pleasure of evaluating your patient, [unfilled]. [Please see my note below.] : Please see my note below. [Sincerely,] : Sincerely, [Consult Closing:] : Thank you very much for allowing me to participate in the care of this patient.  If you have any questions, please do not hesitate to contact me. [DrPerico  ___] : Dr. JEFFERSON [[facility], [facility]] : Leonel Jefferson DO, [facility] [[facility]] : [facility] [Director, Respiratory Care] : Director, Respiratory Care [FreeTextEntry3] : [facility]onel [facility] DO [facility]\par Pulmonary Critical Care\par Director Pulmonary Division\par Medical Director Respiratory Therapy\par [facility]\par \par

## 2020-09-10 NOTE — PHYSICAL EXAM
[General Appearance - Well Developed] : well developed [Normal Appearance] : normal appearance [General Appearance - Well Nourished] : well nourished [No Deformities] : no deformities [Neck Appearance] : the appearance of the neck was normal [Heart Rate And Rhythm] : heart rate and rhythm were normal [Heart Sounds] : normal S1 and S2 [Edema] : no peripheral edema present [Murmurs] : no murmurs present [Respiration, Rhythm And Depth] : normal respiratory rhythm and effort [Auscultation Breath Sounds / Voice Sounds] : lungs were clear to auscultation bilaterally [Lungs Percussion] : the lungs were normal to percussion [Abnormal Walk] : normal gait [Nail Clubbing] : no clubbing of the fingernails [Cyanosis, Localized] : no localized cyanosis [] : no rash [No Focal Deficits] : no focal deficits [Oriented To Time, Place, And Person] : oriented to person, place, and time [Impaired Insight] : insight and judgment were intact [Affect] : the affect was normal [Memory Recent] : recent memory was not impaired [FreeTextEntry1] : No chest wall abnormality

## 2020-09-10 NOTE — PROCEDURE
[FreeTextEntry1] : spirometry with moderate air flow obstruction, no sig change from 2018\par recent labs, normal LFTs and calcium\par CT 10/19: stable sarcoid, no change from 2019\par CXR 7/20: bilat upper lobe infiltrates , slightly more ANA LAURA\par \par Covid neg\par PFts mild air flow obstruction, normal TLC and DLCO , no sig change from 3/17

## 2020-09-15 LAB
ALBUMIN SERPL ELPH-MCNC: 4.8 G/DL
ALP BLD-CCNC: 106 U/L
ALT SERPL-CCNC: 24 U/L
ANION GAP SERPL CALC-SCNC: 14 MMOL/L
AST SERPL-CCNC: 25 U/L
BASOPHILS # BLD AUTO: 0 K/UL
BASOPHILS NFR BLD AUTO: 0 %
BILIRUB SERPL-MCNC: 0.2 MG/DL
BUN SERPL-MCNC: 12 MG/DL
CALCIUM SERPL-MCNC: 10 MG/DL
CHLORIDE SERPL-SCNC: 100 MMOL/L
CHOLEST SERPL-MCNC: 245 MG/DL
CHOLEST/HDLC SERPL: 4.1 RATIO
CO2 SERPL-SCNC: 25 MMOL/L
CREAT SERPL-MCNC: 0.9 MG/DL
EOSINOPHIL # BLD AUTO: 0.1 K/UL
EOSINOPHIL NFR BLD AUTO: 1.8 %
ESTIMATED AVERAGE GLUCOSE: 111 MG/DL
FOLATE SERPL-MCNC: 11.8 NG/ML
GLUCOSE SERPL-MCNC: 93 MG/DL
HBA1C MFR BLD HPLC: 5.5 %
HCT VFR BLD CALC: 44 %
HDLC SERPL-MCNC: 60 MG/DL
HGB BLD-MCNC: 13.8 G/DL
LDLC SERPL CALC-MCNC: 153 MG/DL
LYMPHOCYTES # BLD AUTO: 0.29 K/UL
LYMPHOCYTES NFR BLD AUTO: 5.3 %
MAN DIFF?: NORMAL
MCHC RBC-ENTMCNC: 28.2 PG
MCHC RBC-ENTMCNC: 31.4 GM/DL
MCV RBC AUTO: 90 FL
MONOCYTES # BLD AUTO: 0.58 K/UL
MONOCYTES NFR BLD AUTO: 10.6 %
NEUTROPHILS # BLD AUTO: 4.53 K/UL
NEUTROPHILS NFR BLD AUTO: 82.3 %
PLATELET # BLD AUTO: 301 K/UL
POTASSIUM SERPL-SCNC: 4.1 MMOL/L
PROT SERPL-MCNC: 7.7 G/DL
RBC # BLD: 4.89 M/UL
RBC # FLD: 13.9 %
SODIUM SERPL-SCNC: 139 MMOL/L
TRIGL SERPL-MCNC: 159 MG/DL
TSH SERPL-ACNC: 2.09 UIU/ML
VIT B12 SERPL-MCNC: 504 PG/ML
WBC # FLD AUTO: 5.51 K/UL

## 2020-10-06 ENCOUNTER — NON-APPOINTMENT (OUTPATIENT)
Age: 60
End: 2020-10-06

## 2020-10-06 ENCOUNTER — APPOINTMENT (OUTPATIENT)
Dept: CARDIOLOGY | Facility: CLINIC | Age: 60
End: 2020-10-06
Payer: MEDICAID

## 2020-10-06 VITALS
TEMPERATURE: 98.3 F | DIASTOLIC BLOOD PRESSURE: 80 MMHG | HEART RATE: 84 BPM | OXYGEN SATURATION: 95 % | SYSTOLIC BLOOD PRESSURE: 130 MMHG | WEIGHT: 144 LBS | RESPIRATION RATE: 16 BRPM | BODY MASS INDEX: 28.27 KG/M2 | HEIGHT: 60 IN

## 2020-10-06 PROCEDURE — 99214 OFFICE O/P EST MOD 30 MIN: CPT

## 2020-10-06 PROCEDURE — 93000 ELECTROCARDIOGRAM COMPLETE: CPT

## 2020-10-06 NOTE — REASON FOR VISIT
[Follow-Up - Clinic] : a clinic follow-up of [Hyperlipidemia] : hyperlipidemia [Hypertension] : hypertension [FreeTextEntry1] : sarcoidosis

## 2020-10-06 NOTE — DISCUSSION/SUMMARY
[FreeTextEntry1] : 1. HTN - BP to goal. Not on any medications\par 2. Cardiac MRI (2015) with no evidence of Sarcoidosis. Repeat Echo with strain. \par 3. Continue with lifestyle modification\par 4. Follow up in 1 year. \par

## 2020-10-06 NOTE — HISTORY OF PRESENT ILLNESS
[FreeTextEntry1] : Patient with history of hypertension, sarcoidosis.\par Sarcoidosis- 2001 diagnosed. Enlarged lymph nodes on chest X-ray, biopsy of a nodule and defined as sarcoidosis. Has needed steroids for flare ups. Has also had diagnosis of sarcoidosis of the eye as well. \par Has been seen by cardiology and noted to have an abnormal TTE and then a DEBRA with no significant abnormality. \par Syncopal episode- Evening, 10:30 -11:30, watching television. Walked to the kitchen, felt an abnormal sensation. [place] a loud sound and noted herself to be on the floor. Unclear time. No further episodes since. One previous episode when she was 21 years old, most likely vaso-vagal. \par \par 10/8/2019\par Here for routine f/u. Reports of feeling well. Lost 19 lbs from previous visit to due to dietary change (low carb diet). Has gastric reflux symptoms: cough when in supine position, and vomiting a times after a meal; now sleeps in a recliner. Sees Dr. Aguirre (GI)  and has upcoming Upper Endoscopy on 10/22/2019. Denies chest pain, shortness of breath, fatigue, palpitations, syncope or near syncope, orthopnea and PND. No activity intolerance. No leg edema. Does home exercise using light weights and floor exercise 4 x a week. Has upcoming appointment with her PCP. \par \par 10/2020- Stable. No chest pain. No shortness of breath with mild to moderate activity.  Had pna.

## 2020-10-06 NOTE — PHYSICAL EXAM
[General Appearance - Well Developed] : well developed [Normal Conjunctiva] : the conjunctiva exhibited no abnormalities [Normal Oral Mucosa] : normal oral mucosa [Normal Jugular Venous V Waves Present] : normal jugular venous V waves present [Respiration, Rhythm And Depth] : normal respiratory rhythm and effort [Heart Rate And Rhythm] : heart rate and rhythm were normal [Heart Sounds] : normal S1 and S2 [FreeTextEntry1] : I/VI.  [Bowel Sounds] : normal bowel sounds [Abdomen Soft] : soft [Abnormal Walk] : normal gait [Nail Clubbing] : no clubbing of the fingernails [Cyanosis, Localized] : no localized cyanosis [Skin Color & Pigmentation] : normal skin color and pigmentation [Skin Turgor] : normal skin turgor [] : no rash [Oriented To Time, Place, And Person] : oriented to person, place, and time [Impaired Insight] : insight and judgment were intact [No Anxiety] : not feeling anxious

## 2020-10-29 ENCOUNTER — APPOINTMENT (OUTPATIENT)
Dept: FAMILY MEDICINE | Facility: CLINIC | Age: 60
End: 2020-10-29

## 2020-11-03 ENCOUNTER — APPOINTMENT (OUTPATIENT)
Dept: CARDIOLOGY | Facility: CLINIC | Age: 60
End: 2020-11-03
Payer: MEDICAID

## 2020-11-03 PROCEDURE — 93356 MYOCRD STRAIN IMG SPCKL TRCK: CPT

## 2020-11-03 PROCEDURE — 99072 ADDL SUPL MATRL&STAF TM PHE: CPT

## 2020-11-03 PROCEDURE — 93306 TTE W/DOPPLER COMPLETE: CPT

## 2020-11-12 ENCOUNTER — APPOINTMENT (OUTPATIENT)
Dept: FAMILY MEDICINE | Facility: CLINIC | Age: 60
End: 2020-11-12
Payer: MEDICAID

## 2020-11-12 VITALS
SYSTOLIC BLOOD PRESSURE: 110 MMHG | OXYGEN SATURATION: 98 % | HEIGHT: 60 IN | WEIGHT: 144 LBS | DIASTOLIC BLOOD PRESSURE: 60 MMHG | HEART RATE: 100 BPM | BODY MASS INDEX: 28.27 KG/M2

## 2020-11-12 DIAGNOSIS — Z87.42 PERSONAL HISTORY OF OTHER DISEASES OF THE FEMALE GENITAL TRACT: ICD-10-CM

## 2020-11-12 DIAGNOSIS — Z23 ENCOUNTER FOR IMMUNIZATION: ICD-10-CM

## 2020-11-12 DIAGNOSIS — M25.569 PAIN IN UNSPECIFIED KNEE: ICD-10-CM

## 2020-11-12 PROCEDURE — G0008: CPT

## 2020-11-12 PROCEDURE — 99214 OFFICE O/P EST MOD 30 MIN: CPT | Mod: 25

## 2020-11-12 PROCEDURE — 90686 IIV4 VACC NO PRSV 0.5 ML IM: CPT

## 2020-11-12 PROCEDURE — 99072 ADDL SUPL MATRL&STAF TM PHE: CPT

## 2020-11-14 ENCOUNTER — APPOINTMENT (OUTPATIENT)
Dept: RADIOLOGY | Facility: CLINIC | Age: 60
End: 2020-11-14
Payer: MEDICAID

## 2020-11-14 ENCOUNTER — RESULT REVIEW (OUTPATIENT)
Age: 60
End: 2020-11-14

## 2020-11-14 ENCOUNTER — APPOINTMENT (OUTPATIENT)
Dept: MAMMOGRAPHY | Facility: CLINIC | Age: 60
End: 2020-11-14
Payer: MEDICAID

## 2020-11-14 ENCOUNTER — APPOINTMENT (OUTPATIENT)
Dept: CT IMAGING | Facility: CLINIC | Age: 60
End: 2020-11-14
Payer: MEDICAID

## 2020-11-14 ENCOUNTER — OUTPATIENT (OUTPATIENT)
Dept: OUTPATIENT SERVICES | Facility: HOSPITAL | Age: 60
LOS: 1 days | End: 2020-11-14
Payer: MEDICAID

## 2020-11-14 DIAGNOSIS — Z00.8 ENCOUNTER FOR OTHER GENERAL EXAMINATION: ICD-10-CM

## 2020-11-14 DIAGNOSIS — Z98.89 OTHER SPECIFIED POSTPROCEDURAL STATES: Chronic | ICD-10-CM

## 2020-11-14 DIAGNOSIS — Z92.89 PERSONAL HISTORY OF OTHER MEDICAL TREATMENT: ICD-10-CM

## 2020-11-14 PROCEDURE — 77080 DXA BONE DENSITY AXIAL: CPT | Mod: 26

## 2020-11-14 PROCEDURE — 77067 SCR MAMMO BI INCL CAD: CPT | Mod: 26

## 2020-11-14 PROCEDURE — 77080 DXA BONE DENSITY AXIAL: CPT

## 2020-11-14 PROCEDURE — 77063 BREAST TOMOSYNTHESIS BI: CPT | Mod: 26

## 2020-11-14 PROCEDURE — 71250 CT THORAX DX C-: CPT | Mod: 26

## 2020-11-14 PROCEDURE — 77067 SCR MAMMO BI INCL CAD: CPT

## 2020-11-14 PROCEDURE — 77063 BREAST TOMOSYNTHESIS BI: CPT

## 2020-11-14 PROCEDURE — 71250 CT THORAX DX C-: CPT

## 2020-11-15 NOTE — HISTORY OF PRESENT ILLNESS
[FreeTextEntry1] : pt in office for a B/P check  as well as FLU vaccine. \par \par Endorses her law suit filed for malpractice of her  was dismissed by  for delay in processing. \par She has reckoned with outcome as she can't afford additional action.\par Much support rendered.

## 2020-11-15 NOTE — REVIEW OF SYSTEMS
[Negative] : Neurological [Fever] : no fever [Night Sweats] : no night sweats [Insomnia] : no insomnia

## 2020-11-15 NOTE — ASSESSMENT
[FreeTextEntry1] : BP at [facility]  no change in management.\par \par See HPI and PLAN. \par \par Best wishes offered.

## 2020-11-15 NOTE — PHYSICAL EXAM
[No Acute Distress] : no acute distress [Normal Sclera/Conjunctiva] : normal sclera/conjunctiva [No JVD] : no jugular venous distention [No Accessory Muscle Use] : no accessory muscle use [Clear to Auscultation] : lungs were clear to auscultation bilaterally [Regular Rhythm] : with a regular rhythm [Normal S1, S2] : normal S1 and S2 [No Focal Deficits] : no focal deficits [Normal Gait] : normal gait [Speech Grossly Normal] : speech grossly normal [Alert and Oriented x3] : oriented to person, place, and time [de-identified] : engaging

## 2020-11-16 ENCOUNTER — APPOINTMENT (OUTPATIENT)
Dept: GASTROENTEROLOGY | Facility: CLINIC | Age: 60
End: 2020-11-16
Payer: MEDICAID

## 2020-11-16 VITALS
DIASTOLIC BLOOD PRESSURE: 90 MMHG | WEIGHT: 144 LBS | OXYGEN SATURATION: 97 % | TEMPERATURE: 97.2 F | RESPIRATION RATE: 15 BRPM | BODY MASS INDEX: 28.27 KG/M2 | HEIGHT: 60 IN | HEART RATE: 70 BPM | SYSTOLIC BLOOD PRESSURE: 140 MMHG

## 2020-11-16 PROCEDURE — 99072 ADDL SUPL MATRL&STAF TM PHE: CPT

## 2020-11-16 PROCEDURE — 99204 OFFICE O/P NEW MOD 45 MIN: CPT

## 2020-11-16 NOTE — PHYSICAL EXAM
[General Appearance - Alert] : alert [General Appearance - In No Acute Distress] : in no acute distress [General Appearance - Well Nourished] : well nourished [General Appearance - Well Developed] : well developed [Sclera] : the sclera and conjunctiva were normal [Outer Ear] : the ears and nose were normal in appearance [Neck Appearance] : the appearance of the neck was normal [] : no respiratory distress [Respiration, Rhythm And Depth] : normal respiratory rhythm and effort [Exaggerated Use Of Accessory Muscles For Inspiration] : no accessory muscle use [Auscultation Breath Sounds / Voice Sounds] : lungs were clear to auscultation bilaterally [Apical Impulse] : the apical impulse was normal [Heart Rate And Rhythm] : heart rate was normal and rhythm regular [Heart Sounds] : normal S1 and S2 [Bowel Sounds] : normal bowel sounds [Abdomen Soft] : soft [Abdomen Tenderness] : non-tender [Femoral Lymph Nodes Enlarged Bilaterally] : femoral [Skin Color & Pigmentation] : normal skin color and pigmentation [Oriented To Time, Place, And Person] : oriented to person, place, and time [Impaired Insight] : insight and judgment were intact [Affect] : the affect was normal

## 2020-11-17 ENCOUNTER — TRANSCRIPTION ENCOUNTER (OUTPATIENT)
Age: 60
End: 2020-11-17

## 2020-11-17 ENCOUNTER — NON-APPOINTMENT (OUTPATIENT)
Age: 60
End: 2020-11-17

## 2021-01-15 ENCOUNTER — RESULT REVIEW (OUTPATIENT)
Age: 61
End: 2021-01-15

## 2021-01-20 ENCOUNTER — NON-APPOINTMENT (OUTPATIENT)
Age: 61
End: 2021-01-20

## 2021-01-21 ENCOUNTER — APPOINTMENT (OUTPATIENT)
Dept: FAMILY MEDICINE | Facility: CLINIC | Age: 61
End: 2021-01-21
Payer: MEDICAID

## 2021-01-21 VITALS
TEMPERATURE: 97.6 F | HEART RATE: 102 BPM | HEIGHT: 60 IN | DIASTOLIC BLOOD PRESSURE: 100 MMHG | WEIGHT: 146 LBS | SYSTOLIC BLOOD PRESSURE: 140 MMHG | BODY MASS INDEX: 28.66 KG/M2

## 2021-01-21 PROCEDURE — 99214 OFFICE O/P EST MOD 30 MIN: CPT

## 2021-01-21 PROCEDURE — 99072 ADDL SUPL MATRL&STAF TM PHE: CPT

## 2021-01-21 RX ORDER — LANSOPRAZOLE 15 MG/1
15 CAPSULE, DELAYED RELEASE ORAL
Refills: 0 | Status: DISCONTINUED | COMMUNITY
End: 2021-01-21

## 2021-01-26 NOTE — PHYSICAL EXAM
[No Acute Distress] : no acute distress [Normal Sclera/Conjunctiva] : normal sclera/conjunctiva [No JVD] : no jugular venous distention [No Respiratory Distress] : no respiratory distress  [No Accessory Muscle Use] : no accessory muscle use [Clear to Auscultation] : lungs were clear to auscultation bilaterally [Regular Rhythm] : with a regular rhythm [Normal S1, S2] : normal S1 and S2 [No Edema] : there was no peripheral edema [Soft] : abdomen soft [Non Tender] : non-tender [No Focal Deficits] : no focal deficits [Normal Gait] : normal gait [Alert and Oriented x3] : oriented to person, place, and time [Normal Insight/Judgement] : insight and judgment were intact [de-identified] : a bit anxious   a bit loquacious   well groomed  [de-identified] : OMM   MASK

## 2021-01-26 NOTE — ASSESSMENT
[FreeTextEntry1] : Anxiety / PANIC  now resolved.\par \par Reviewed therapeutic deep breathing .\par \par =/- counseling

## 2021-01-26 NOTE — HISTORY OF PRESENT ILLNESS
[FreeTextEntry1] : pt in office to talk about panic attack. \par \par Endorses multiple stressors in he life including financial. interpersonal and loneliness form COVID and unemployed.  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 \par \par In her own words :\par \par January 8 2021 :\par Test site in [place] /EAS Exam\par 20 minuets into test felt nauseous, room hot, head heavy, headache. (Room was 75 degrees as per staff member)\par Took a break and went to restroom; splashed water on my face\par went back into room to continue test; 20 minutes later felt even worse.\par Took   2nd break;   did not help\par Told staff I could not continue.\par I was luke out \par Laid  in my car or 20 minutes before driving home.\par \par Contacted Basilio right away  and again on January 9th.\par \par

## 2021-02-05 NOTE — HISTORY OF PRESENT ILLNESS
[de-identified] :  Patient with hx of IBS X 9 years. Symptoms can be severe. Occur about once a month and lasts 2 hours.  Patient has alternating diarrhea and constipation. Gets severe abdominal cramping - then nausea and vomiting . + diaphoresis.   Tries to have BM- but constipated. After the first hard BM, then severe diarrhea.  No weight loss.  No family hx of colon cancer. Attempted colonosocpy in 2016- poor prep. Did not come back for F/U colonoscopy- she is currently refusing colonscopy. \par    Hx of sarcoidosis.  Has chronic cough  X 3  yearsl. No relation to eating or particular foods. OCcurs all day. Had EGD last year with Dr Haynes- I don’t have results. Says she was supposed to have motility and Bravo  at [facility], but did not have due to [place] pandemic

## 2021-02-05 NOTE — ASSESSMENT
[FreeTextEntry1] : A/P\par IBS- high fiber diet\par bentyl 20 mg qid prn\par \par Colon cancer screening- refused colonoscopy\par - will do cologard\par \par cough- ? gerd\par Today's instructions for acid reflux include avoid provocative foods. For example citrus alcohol coffee chocolate mints. Smaller meals, no eating 3 hours prior to bedtime and elevate head of the bed prior to sleep.\par increase prevacid 15 mg to 30 m g a day\par - get EGD report from Dr Haynes\par -  can consider schedule motlity and bravo study if pt has cough despite maximum GERD treatment \par - F/U in 3 months

## 2021-03-01 ENCOUNTER — NON-APPOINTMENT (OUTPATIENT)
Age: 61
End: 2021-03-01

## 2021-03-04 ENCOUNTER — APPOINTMENT (OUTPATIENT)
Dept: FAMILY MEDICINE | Facility: CLINIC | Age: 61
End: 2021-03-04
Payer: MEDICAID

## 2021-03-04 VITALS
HEART RATE: 82 BPM | DIASTOLIC BLOOD PRESSURE: 100 MMHG | TEMPERATURE: 97.2 F | SYSTOLIC BLOOD PRESSURE: 130 MMHG | HEIGHT: 60 IN

## 2021-03-04 VITALS — SYSTOLIC BLOOD PRESSURE: 130 MMHG | DIASTOLIC BLOOD PRESSURE: 84 MMHG

## 2021-03-04 DIAGNOSIS — Z86.19 PERSONAL HISTORY OF OTHER INFECTIOUS AND PARASITIC DISEASES: ICD-10-CM

## 2021-03-04 DIAGNOSIS — K58.2 MIXED IRRITABLE BOWEL SYNDROME: ICD-10-CM

## 2021-03-04 DIAGNOSIS — F41.9 ANXIETY DISORDER, UNSPECIFIED: ICD-10-CM

## 2021-03-04 PROCEDURE — 99072 ADDL SUPL MATRL&STAF TM PHE: CPT

## 2021-03-04 PROCEDURE — 99214 OFFICE O/P EST MOD 30 MIN: CPT

## 2021-03-07 PROBLEM — K58.2 IRRITABLE BOWEL SYNDROME WITH BOTH CONSTIPATION AND DIARRHEA: Status: ACTIVE | Noted: 2020-11-16

## 2021-03-07 PROBLEM — Z86.19 HISTORY OF HERPES ZOSTER: Status: RESOLVED | Noted: 2019-01-23 | Resolved: 2021-03-07

## 2021-03-07 PROBLEM — F41.9 ACUTE ANXIETY: Status: ACTIVE | Noted: 2021-01-21

## 2021-03-07 NOTE — PHYSICAL EXAM
[No Acute Distress] : no acute distress [Normal Sclera/Conjunctiva] : normal sclera/conjunctiva [No JVD] : no jugular venous distention [No Respiratory Distress] : no respiratory distress  [No Accessory Muscle Use] : no accessory muscle use [Clear to Auscultation] : lungs were clear to auscultation bilaterally [Regular Rhythm] : with a regular rhythm [Normal S1, S2] : normal S1 and S2 [No Edema] : there was no peripheral edema [Soft] : abdomen soft [Non Tender] : non-tender [No Focal Deficits] : no focal deficits [Normal Gait] : normal gait [Alert and Oriented x3] : oriented to person, place, and time [Normal Insight/Judgement] : insight and judgment were intact [Non-distended] : non-distended [de-identified] : a bit anxious   a bit loquacious   well groomed  [de-identified] : OMM   MASK [de-identified] : SIERRA hyperactive a bit

## 2021-03-07 NOTE — HISTORY OF PRESENT ILLNESS
[FreeTextEntry1] : FU on Panic Attacks\par Did not like the way  Lexapro made her feel so stopped it; "I'm doing OK"\par \par Advised again to FU with Dr. Torres re: persistent symptoms of intermittent abdominal complaints of "bloating and cramping . \par As advised to me, by Dr. Torres,, she should  arrange for motility testing soon to be available in her office.  [de-identified] : In review" Jan. 21 2020\par pt in office to talk about panic attack. \par Endorses multiple stressors in he life including financial. interpersonal and loneliness form COVID and unemployed. 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 \par \par In her own words :\par January 8 2021 :\par Test site in [place] /EAS Exam\par 20 minuets into test felt nauseous, room hot, head heavy, headache. (Room was 75 degrees as per staff member)\par Took a break and went to restroom; splashed water on my face\par went back into room to continue test; 20 minutes later felt even worse.\par Took   2nd break;   did not help\par Told staff I could not continue.\par I was luke out \par Laid  in my car or 20 minutes before driving home.\par \par Contacted Basilio right away  and again on January 9th.

## 2021-03-07 NOTE — PLAN
[FreeTextEntry1] : as in HPI \par \par reviewed therapeutic breathing and again consider counseling.  n line resources given.

## 2021-03-07 NOTE — REVIEW OF SYSTEMS
[Anxiety] : anxiety [Negative] : Musculoskeletal [Fever] : no fever [Night Sweats] : no night sweats [FreeTextEntry7] : intermittent symptoms of IBS

## 2021-03-11 ENCOUNTER — APPOINTMENT (OUTPATIENT)
Dept: GASTROENTEROLOGY | Facility: CLINIC | Age: 61
End: 2021-03-11
Payer: MEDICAID

## 2021-03-11 VITALS
HEIGHT: 60 IN | DIASTOLIC BLOOD PRESSURE: 100 MMHG | TEMPERATURE: 97.7 F | SYSTOLIC BLOOD PRESSURE: 146 MMHG | HEART RATE: 80 BPM | WEIGHT: 150 LBS | BODY MASS INDEX: 29.45 KG/M2

## 2021-03-11 PROCEDURE — 99072 ADDL SUPL MATRL&STAF TM PHE: CPT

## 2021-03-11 PROCEDURE — 99214 OFFICE O/P EST MOD 30 MIN: CPT

## 2021-03-11 NOTE — ASSESSMENT
[FreeTextEntry1] : A/P\par IBS- high fiber diet\par bentyl prn\par \par \par cough. ? GERD\par will  try prilosec 20 mg bid\par Today's instructions for acid reflux include avoid provocative foods. For example citrus alcohol coffee chocolate mints. Smaller meals, no eating 3 hours prior to bedtime and elevate head of the bed prior to sleep.\par \par F/U in 3 motnhs

## 2021-03-11 NOTE — PHYSICAL EXAM
[General Appearance - Alert] : alert [General Appearance - In No Acute Distress] : in no acute distress [General Appearance - Well Nourished] : well nourished [Sclera] : the sclera and conjunctiva were normal [Outer Ear] : the ears and nose were normal in appearance [Neck Appearance] : the appearance of the neck was normal [Neck Cervical Mass (___cm)] : no neck mass was observed [] : no respiratory distress [Respiration, Rhythm And Depth] : normal respiratory rhythm and effort [Exaggerated Use Of Accessory Muscles For Inspiration] : no accessory muscle use [Auscultation Breath Sounds / Voice Sounds] : lungs were clear to auscultation bilaterally [Apical Impulse] : the apical impulse was normal [Heart Rate And Rhythm] : heart rate was normal and rhythm regular [Heart Sounds] : normal S1 and S2 [Bowel Sounds] : normal bowel sounds [Abdomen Soft] : soft [Abdomen Tenderness] : non-tender [Femoral Lymph Nodes Enlarged Bilaterally] : femoral [Skin Color & Pigmentation] : normal skin color and pigmentation [Oriented To Time, Place, And Person] : oriented to person, place, and time [Impaired Insight] : insight and judgment were intact [Affect] : the affect was normal

## 2021-03-11 NOTE — HISTORY OF PRESENT ILLNESS
[de-identified] : IBS-  10 years, severe. Gets severe generalized cramping, diarrhea every 3 weeks. Symptoms will last one day.  better with bentyl tid on those days. No weight loss. No family hx of colon cancer. Recent cologard negative. \par     Patient with rare heartburn- rare burning in throat few times a month.  Saw ENT years ago and told larygoscopy negative.  No heartburn. Hx of sarcoid.  3 years chronic cough. No better with prevacid 30 mg qd.  EGD in 2019 was grossly normal . Biopsies showed mild esophagitis, negative Hp and celiac.

## 2021-04-12 ENCOUNTER — RX RENEWAL (OUTPATIENT)
Age: 61
End: 2021-04-12

## 2021-04-29 ENCOUNTER — APPOINTMENT (OUTPATIENT)
Dept: PULMONOLOGY | Facility: CLINIC | Age: 61
End: 2021-04-29
Payer: MEDICAID

## 2021-04-29 VITALS
TEMPERATURE: 97.1 F | DIASTOLIC BLOOD PRESSURE: 70 MMHG | SYSTOLIC BLOOD PRESSURE: 120 MMHG | HEIGHT: 60 IN | OXYGEN SATURATION: 97 % | HEART RATE: 78 BPM | WEIGHT: 146 LBS | BODY MASS INDEX: 28.66 KG/M2

## 2021-04-29 PROCEDURE — 99072 ADDL SUPL MATRL&STAF TM PHE: CPT

## 2021-04-29 PROCEDURE — 99213 OFFICE O/P EST LOW 20 MIN: CPT

## 2021-04-29 NOTE — HISTORY OF PRESENT ILLNESS
[TextBox_4] : \par had 2-3 day episode of GI complaints, nausea, vomiting abdominal pain, some blood in stools\par Covid negative\par no fever, chill, chest pain\par no sputum\par pulmonary at  baseline\par Cardiac work up negative for Sarcoid 2015,MRI\par Breo daily\par no acute pulmonary complaints [FreeTextEntry1] : \par

## 2021-04-29 NOTE — CONSULT LETTER
[Dear  ___] : Dear  [unfilled], [Consult Letter:] : I had the pleasure of evaluating your patient, [unfilled]. [Please see my note below.] : Please see my note below. [Consult Closing:] : Thank you very much for allowing me to participate in the care of this patient.  If you have any questions, please do not hesitate to contact me. [Sincerely,] : Sincerely, [Leonel Jefferson DO, [facility]] : Leonel Jefferson DO, [facility] [Director, Respiratory Care] : Director, Respiratory Care [[facility]] : [facility] [FreeTextEntry3] : [facility]\par Pulmonary Critical Care\par Director Pulmonary Division\par Medical Director Respiratory Therapy\par [facility]\par \par  [DrPerico  ___] : Dr. JEFFERSON

## 2021-04-29 NOTE — DISCUSSION/SUMMARY
[FreeTextEntry1] : Fibronodular Sarcoidosis radiographic stage II, with chronic cough, GERD ,  moderate persistent asthma, \par at  baseline, no fevers, GI work up in progress\par exam without bronchospasm, normal sp02, \par PFts stable x 3yr, mild air flow obstruction, normal TLC and DLCO, remains on Breo\par GI follow up advised\par 11/20   CT scan stable  parenchymal sarcoid , perihilar nodular masses stable from 10/19\par 6 months or sooner if needed\par She will contact me sooner if any change in status

## 2021-05-17 NOTE — REVIEW OF SYSTEMS
72 y/o male with PMHx of HLD, Vertigo c/o dizziness that has now resolved. Priority CT called, sent to main [Negative] : Musculoskeletal [Night Sweats] : no night sweats [FreeTextEntry6] : as in HPI  [FreeTextEntry7] : as in HPI

## 2021-05-25 ENCOUNTER — APPOINTMENT (OUTPATIENT)
Dept: GASTROENTEROLOGY | Facility: CLINIC | Age: 61
End: 2021-05-25
Payer: MEDICAID

## 2021-05-25 VITALS
SYSTOLIC BLOOD PRESSURE: 132 MMHG | DIASTOLIC BLOOD PRESSURE: 70 MMHG | BODY MASS INDEX: 28.27 KG/M2 | WEIGHT: 144 LBS | HEIGHT: 60 IN | HEART RATE: 98 BPM | OXYGEN SATURATION: 96 % | TEMPERATURE: 98 F

## 2021-05-25 DIAGNOSIS — K58.0 IRRITABLE BOWEL SYNDROME WITH DIARRHEA: ICD-10-CM

## 2021-05-25 PROCEDURE — 99214 OFFICE O/P EST MOD 30 MIN: CPT

## 2021-05-25 RX ORDER — DICYCLOMINE HYDROCHLORIDE 20 MG/1
20 TABLET ORAL 3 TIMES DAILY
Qty: 360 | Refills: 3 | Status: DISCONTINUED | COMMUNITY
Start: 2020-11-16 | End: 2021-05-25

## 2021-05-25 NOTE — HISTORY OF PRESENT ILLNESS
[de-identified] : The patient has had IBS for 10 years, symptoms are severe.  Specifically she gets severe lower abdominal cramping followed by diarrhea.  It occurs for a few days throughout the month but symptoms are severe.  She tried Bentyl in the morning then as needed during the day.  States that it does not resolve her severe cramping.  She has no weight loss.  Her Cologuard was negative.  We again discussed colonoscopy.  She does not feel that she could tolerate a liquid prep.  We discussed Lozano Tab and she is agreeable for colonoscopy.\par     Patient has dry chronic cough.  She is currently denying any heartburn or regurgitation.  In the past she described a throat burning with the cough.  She had no relief with Prevacid.  She was taking Prilosec 20 mg a day with no relief of the cough.  She states she has a dry cough "always".  Feels like air comes up from the stomach triggers the cough.  She is denying belching though.  EGD in 2019 was negative.  Biopsies were negative for esophagitis H. pylori and celiac.\par

## 2021-05-25 NOTE — REASON FOR VISIT
[Follow-Up: _____] : a [unfilled] follow-up visit [FreeTextEntry1] : The patient is here for follow-up of IBS, chronic cough

## 2021-05-25 NOTE — PHYSICAL EXAM
[General Appearance - Alert] : alert [General Appearance - In No Acute Distress] : in no acute distress [General Appearance - Well Nourished] : well nourished [General Appearance - Well Developed] : well developed [Sclera] : the sclera and conjunctiva were normal [Outer Ear] : the ears and nose were normal in appearance [Hearing Threshold Finger Rub Not [place]] : hearing was normal [Both Tympanic Membranes Were Examined] : both tympanic membranes were normal [Neck Appearance] : the appearance of the neck was normal [Neck Cervical Mass (___cm)] : no neck mass was observed [] : no respiratory distress [Respiration, Rhythm And Depth] : normal respiratory rhythm and effort [Exaggerated Use Of Accessory Muscles For Inspiration] : no accessory muscle use [Auscultation Breath Sounds / Voice Sounds] : lungs were clear to auscultation bilaterally [Apical Impulse] : the apical impulse was normal [Heart Rate And Rhythm] : heart rate was normal and rhythm regular [Heart Sounds] : normal S1 and S2 [Bowel Sounds] : normal bowel sounds [Abdomen Soft] : soft [Abdomen Tenderness] : non-tender [Femoral Lymph Nodes Enlarged Bilaterally] : femoral [Skin Color & Pigmentation] : normal skin color and pigmentation [Oriented To Time, Place, And Person] : oriented to person, place, and time [Impaired Insight] : insight and judgment were intact [Affect] : the affect was normal

## 2021-05-25 NOTE — ASSESSMENT
[FreeTextEntry1] : A/P\par The patient is here for follow-up of IBS.\par -We will change her medication from dicyclomine to Librax as needed\par -Patient is now agreeable for screening colonoscopy.  Sutab written.  Patient will not take any type of oral liquid prep\par  I discussed the risks and benefits of colonoscopy and patient was given opportunity to ask questions. Colonoscopy to r/o colon cancer, polyps, AVM's. Patient is medically optimized for the procedure\par \par Patient with chronic cough-not clear if this is GERD related.  We will increase her PPI to twice daily.  If she has no improvement of her cough , then the PPI will be discontinued completely.\par \par -Follow-up in 3 months

## 2021-06-15 ENCOUNTER — APPOINTMENT (OUTPATIENT)
Dept: FAMILY MEDICINE | Facility: CLINIC | Age: 61
End: 2021-06-15
Payer: MEDICAID

## 2021-06-15 VITALS
RESPIRATION RATE: 16 BRPM | SYSTOLIC BLOOD PRESSURE: 142 MMHG | HEART RATE: 94 BPM | OXYGEN SATURATION: 99 % | WEIGHT: 141 LBS | BODY MASS INDEX: 27.68 KG/M2 | DIASTOLIC BLOOD PRESSURE: 90 MMHG | HEIGHT: 60 IN

## 2021-06-15 DIAGNOSIS — Z11.1 ENCOUNTER FOR SCREENING FOR RESPIRATORY TUBERCULOSIS: ICD-10-CM

## 2021-06-15 PROCEDURE — 86580 TB INTRADERMAL TEST: CPT

## 2021-06-15 PROCEDURE — 99214 OFFICE O/P EST MOD 30 MIN: CPT | Mod: 25

## 2021-06-15 RX ORDER — ESCITALOPRAM OXALATE 10 MG/1
10 TABLET ORAL
Qty: 90 | Refills: 0 | Status: DISCONTINUED | COMMUNITY
Start: 2021-01-21 | End: 2021-06-15

## 2021-06-16 NOTE — ASSESSMENT
[FreeTextEntry1] : ASSESSMENT: Ms. FLORIN MARIA is a 60 year old female presenting to the clinic today regarding a follow up.\par \par # PE/vitals obtained - normal\par # PPD placed

## 2021-06-16 NOTE — ADDENDUM
[FreeTextEntry1] : I, Nunu Mccartney, verify that that I acted solely as a scribe for Dr. Susi Perez on this date, 06/15/2021.

## 2021-06-16 NOTE — END OF VISIT
[FreeTextEntry2] : This note was written by JULIAN RODRIGUEZ on 06/15/2021, acting solely as a scribe for Dr. Susi Perez MD. \par \par All medical record entries made by the scribe, JULIAN RODRIGUEZ, were at my, Dr. Charmaine Lieberman MD, direction and personally dictated by me on 06/15/2021. I have personally reviewed the chart and agree that the record accurately reflects my personal performance and care.

## 2021-06-16 NOTE — HISTORY OF PRESENT ILLNESS
[FreeTextEntry1] : pt here for f/u [de-identified] : FLORIN MARIA is a 60 year old female presenting to the clinic today for a follow up. Mood is normal, appears well. Patient is here for a PPD screening for work.

## 2021-07-12 ENCOUNTER — RX RENEWAL (OUTPATIENT)
Age: 61
End: 2021-07-12

## 2021-08-02 ENCOUNTER — APPOINTMENT (OUTPATIENT)
Dept: GASTROENTEROLOGY | Facility: GI CENTER | Age: 61
End: 2021-08-02

## 2021-10-07 ENCOUNTER — APPOINTMENT (OUTPATIENT)
Dept: CARDIOLOGY | Facility: CLINIC | Age: 61
End: 2021-10-07
Payer: COMMERCIAL

## 2021-10-07 ENCOUNTER — NON-APPOINTMENT (OUTPATIENT)
Age: 61
End: 2021-10-07

## 2021-10-07 VITALS
BODY MASS INDEX: 25.32 KG/M2 | HEIGHT: 60 IN | DIASTOLIC BLOOD PRESSURE: 76 MMHG | SYSTOLIC BLOOD PRESSURE: 148 MMHG | WEIGHT: 129 LBS | HEART RATE: 79 BPM | OXYGEN SATURATION: 98 % | TEMPERATURE: 98.6 F

## 2021-10-07 DIAGNOSIS — R06.02 SHORTNESS OF BREATH: ICD-10-CM

## 2021-10-07 DIAGNOSIS — R09.89 OTHER SPECIFIED SYMPTOMS AND SIGNS INVOLVING THE CIRCULATORY AND RESPIRATORY SYSTEMS: ICD-10-CM

## 2021-10-07 PROCEDURE — 99215 OFFICE O/P EST HI 40 MIN: CPT

## 2021-10-07 PROCEDURE — 93000 ELECTROCARDIOGRAM COMPLETE: CPT

## 2021-10-07 RX ORDER — OMEPRAZOLE 20 MG/1
20 CAPSULE, DELAYED RELEASE ORAL TWICE DAILY
Qty: 180 | Refills: 1 | Status: DISCONTINUED | COMMUNITY
Start: 2021-05-25 | End: 2021-10-07

## 2021-10-07 RX ORDER — SODIUM SULFATE, MAGNESIUM SULFATE, AND POTASSIUM CHLORIDE 17.75; 2.7; 2.25 G/1; G/1; G/1
1479-225-188 TABLET ORAL
Qty: 24 | Refills: 0 | Status: DISCONTINUED | COMMUNITY
Start: 2021-05-25 | End: 2021-10-07

## 2021-10-07 RX ORDER — CHLORDIAZEPOXIDE HYDROCHLORIDE AND CLIDINIUM BROMIDE 5; 2.5 MG/1; MG/1
5-2.5 CAPSULE, GELATIN COATED ORAL
Qty: 270 | Refills: 1 | Status: DISCONTINUED | COMMUNITY
Start: 2021-05-25 | End: 2021-10-07

## 2021-10-21 ENCOUNTER — RX RENEWAL (OUTPATIENT)
Age: 61
End: 2021-10-21

## 2021-11-08 ENCOUNTER — APPOINTMENT (OUTPATIENT)
Dept: CARDIOLOGY | Facility: CLINIC | Age: 61
End: 2021-11-08

## 2021-11-08 ENCOUNTER — APPOINTMENT (OUTPATIENT)
Dept: FAMILY MEDICINE | Facility: CLINIC | Age: 61
End: 2021-11-08

## 2021-11-22 ENCOUNTER — APPOINTMENT (OUTPATIENT)
Dept: FAMILY MEDICINE | Facility: CLINIC | Age: 61
End: 2021-11-22

## 2022-01-28 ENCOUNTER — APPOINTMENT (OUTPATIENT)
Dept: PULMONOLOGY | Facility: CLINIC | Age: 62
End: 2022-01-28

## 2022-02-17 ENCOUNTER — APPOINTMENT (OUTPATIENT)
Dept: FAMILY MEDICINE | Facility: CLINIC | Age: 62
End: 2022-02-17
Payer: COMMERCIAL

## 2022-02-17 ENCOUNTER — LABORATORY RESULT (OUTPATIENT)
Age: 62
End: 2022-02-17

## 2022-02-17 ENCOUNTER — NON-APPOINTMENT (OUTPATIENT)
Age: 62
End: 2022-02-17

## 2022-02-17 VITALS
SYSTOLIC BLOOD PRESSURE: 130 MMHG | HEIGHT: 60 IN | DIASTOLIC BLOOD PRESSURE: 90 MMHG | WEIGHT: 139 LBS | HEART RATE: 80 BPM | BODY MASS INDEX: 27.29 KG/M2

## 2022-02-17 DIAGNOSIS — E78.5 HYPERLIPIDEMIA, UNSPECIFIED: ICD-10-CM

## 2022-02-17 DIAGNOSIS — I10 ESSENTIAL (PRIMARY) HYPERTENSION: ICD-10-CM

## 2022-02-17 DIAGNOSIS — K21.9 GASTRO-ESOPHAGEAL REFLUX DISEASE W/OUT ESOPHAGITIS: ICD-10-CM

## 2022-02-17 DIAGNOSIS — Z12.31 ENCOUNTER FOR SCREENING MAMMOGRAM FOR MALIGNANT NEOPLASM OF BREAST: ICD-10-CM

## 2022-02-17 PROCEDURE — 93000 ELECTROCARDIOGRAM COMPLETE: CPT

## 2022-02-17 PROCEDURE — 99396 PREV VISIT EST AGE 40-64: CPT | Mod: 25

## 2022-02-17 PROCEDURE — 99214 OFFICE O/P EST MOD 30 MIN: CPT | Mod: 25

## 2022-02-17 PROCEDURE — 36415 COLL VENOUS BLD VENIPUNCTURE: CPT

## 2022-02-17 NOTE — PLAN
[FreeTextEntry1] : In review September 2020:\par  Well exam for 60 year old female  with PMH as stated in HPI / active list. \par \par Management : \par \par See HPI and Plan\par \par Labs in office today.   Will advise. \par \par Best wishes offered !\par

## 2022-02-22 NOTE — COUNSELING
[Healthy eating counseling provided] : healthy eating [Activity counseling provided] : activity [de-identified] : core strengthening advised

## 2022-02-22 NOTE — ASSESSMENT
[FreeTextEntry1] : CPE for 61 year old F with PMH as stated in HPI / active list. \par \par Management : \par \par See HPI and Plan.\par \par Labs in office today, will advise.\par \par Mammogram script provided, FIT card script provided, ( will also need GI for screening colonoscopy)  DEXA script provided.\par \par Pt advised to follow up with PULM.  \par \par Best wishes offered!

## 2022-02-22 NOTE — HISTORY OF PRESENT ILLNESS
[de-identified] : In review September 2020:\par CPE \par Last CPE was Feb. 2019 \par In recent year has continued RFU with Pulmonology ( sarcoidosis)  and GI GERD. 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 \par Continues to live with mother; court case with ex-   "all on hold". [FreeTextEntry1] : In review: Jan 2015 \par 54 year old [place]  female presents today to establish care being referred to me by insurance.\par She is a bit medical history significant for sarcoidosis, which was diagnosed at the age of 39.  \par Follow with Pulmonology and reports stability.  We'll request office know.  Also has a history of hypertension.\par  Recent weeks, feels upper chest discomfort with worsening cough and nasal congestion.\par No worsening SOB. No fever or chills. \par Not able to get appt. with pulmonology until next week.\par \par She describes an episode of blacking out in mid December.  She was in her kitchen standing at near counter  when she sensed blackness and woke up on the floor.  This event was unwitnessed.  One other episode in her lifetime years ago with cardiac  workup negative.\par \par Social:   from .  2 children  ( 21 and 25 )  live with him.\par attempting employment in cosmetic industry.  Mother lives in area and a support.

## 2022-02-22 NOTE — REVIEW OF SYSTEMS
[see HPI] : see HPI [Anxiety] : anxiety [Negative] : Endocrine [Fever] : no fever [Chills] : no chills [Eyesight Problems] : no eyesight problems [Feeling Tired] : not feeling tired [Dry Eyes] : no dryness of the eyes [Cough] : no cough [SOB on Exertion] : no shortness of breath during exertion [PND] : no PND [Melena] : no melena [Confused] : no confusion [Dizziness] : no dizziness [Difficulty Walking] : no difficulty walking [Sleep Disturbances] : no sleep disturbances [Change In Personality] : no personality change

## 2022-02-22 NOTE — ADDENDUM
[FreeTextEntry1] : Medical record entries made by the scribe today, were at my direction and personally dictated to them by me, Dr. Nikole Boudreaux on 02/17/2022.  I have reviewed the chart and agree that the record accurately reflects my personal performance of the history, physical exam, assessment and plan.\par \par Vladimir GOLDSMITH acting as scribe for Dr. Nikole Boudreaux MD on 02/17/2022 at 11:15 AM.\par

## 2022-02-22 NOTE — HEALTH RISK ASSESSMENT
[Very Good] : ~his/her~  mood as very good [Patient reported mammogram was normal] : Patient reported mammogram was normal [Patient reported PAP Smear was normal] : Patient reported PAP Smear was normal [Financial] : financial [Employed] : employed [College] : College [] :  [# Of Children ___] : has [unfilled] children [Feels Safe at Home] : Feels safe at home [Fully functional (bathing, dressing, toileting, transferring, walking, feeding)] : Fully functional (bathing, dressing, toileting, transferring, walking, feeding) [Fully functional (using the telephone, shopping, preparing meals, housekeeping, doing laundry, using] : Fully functional and needs no help or supervision to perform IADLs (using the telephone, shopping, preparing meals, housekeeping, doing laundry, using transportation, managing medications and managing finances) [Smoke Detector] : smoke detector [Seat Belt] :  uses seat belt [Never] : Never [No falls in past year] : Patient reported no falls in the past year [0] : 2) Feeling down, depressed, or hopeless: Not at all (0) [PHQ-2 Negative - No further assessment needed] : PHQ-2 Negative - No further assessment needed [de-identified] : work! 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

## 2022-02-22 NOTE — PHYSICAL EXAM
[General Appearance - Alert] : alert [General Appearance - In No Acute Distress] : in no acute distress [General Appearance - Well-Appearing] : healthy appearing [Sclera] : the sclera and conjunctiva were normal [PERRL With Normal Accommodation] : pupils were equal in size, round, and reactive to light [Thyroid Diffuse Enlargement] : the thyroid was not enlarged [Respiration, Rhythm And Depth] : normal respiratory rhythm and effort [Auscultation Breath Sounds / Voice Sounds] : lungs were clear to auscultation bilaterally [Edema] : there was no peripheral edema [Veins - Varicosity Changes] : there were no varicosital changes [Bowel Sounds] : normal bowel sounds [Abdomen Soft] : soft [Abdomen Mass (___ Cm)] : no abdominal mass palpated [Cervical Lymph Nodes Enlarged Posterior Bilaterally] : posterior cervical [Cervical Lymph Nodes Enlarged Anterior Bilaterally] : anterior cervical [Supraclavicular Lymph Nodes Enlarged Bilaterally] : supraclavicular [Abnormal Walk] : normal gait [Nail Clubbing] : no clubbing  or cyanosis of the fingernails [Involuntary Movements] : no involuntary movements were seen [Skin Turgor] : normal skin turgor [] : no rash [Deep Tendon Reflexes (DTR)] : deep tendon reflexes were 2+ and symmetric [No Focal Deficits] : no focal deficits [Oriented To Time, Place, And Person] : oriented to person, place, and time [Impaired Insight] : insight and judgment were intact [FreeTextEntry1] : warm and dry

## 2022-02-28 LAB
ALBUMIN SERPL ELPH-MCNC: 4.3 G/DL
ALP BLD-CCNC: 95 U/L
ALT SERPL-CCNC: 24 U/L
ANION GAP SERPL CALC-SCNC: 10 MMOL/L
APPEARANCE: CLEAR
AST SERPL-CCNC: 25 U/L
BASOPHILS # BLD AUTO: 0.02 K/UL
BASOPHILS NFR BLD AUTO: 0.5 %
BILIRUB SERPL-MCNC: 0.2 MG/DL
BILIRUBIN URINE: NEGATIVE
BLOOD URINE: NEGATIVE
BUN SERPL-MCNC: 17 MG/DL
CALCIUM SERPL-MCNC: 9.6 MG/DL
CHLORIDE SERPL-SCNC: 103 MMOL/L
CHOLEST SERPL-MCNC: 205 MG/DL
CO2 SERPL-SCNC: 24 MMOL/L
COLOR: NORMAL
CREAT SERPL-MCNC: 0.91 MG/DL
EOSINOPHIL # BLD AUTO: 0.12 K/UL
EOSINOPHIL NFR BLD AUTO: 2.9 %
ESTIMATED AVERAGE GLUCOSE: 114 MG/DL
GLUCOSE QUALITATIVE U: NEGATIVE
GLUCOSE SERPL-MCNC: 87 MG/DL
HBA1C MFR BLD HPLC: 5.6 %
HCT VFR BLD CALC: 38.9 %
HDLC SERPL-MCNC: 67 MG/DL
HGB BLD-MCNC: 12.1 G/DL
IMM GRANULOCYTES NFR BLD AUTO: 0.2 %
KETONES URINE: NEGATIVE
LDLC SERPL CALC-MCNC: 128 MG/DL
LEUKOCYTE ESTERASE URINE: ABNORMAL
LYMPHOCYTES # BLD AUTO: 0.32 K/UL
LYMPHOCYTES NFR BLD AUTO: 7.6 %
MAN DIFF?: NORMAL
MCHC RBC-ENTMCNC: 28.1 PG
MCHC RBC-ENTMCNC: 31.1 GM/DL
MCV RBC AUTO: 90.5 FL
MONOCYTES # BLD AUTO: 0.53 K/UL
MONOCYTES NFR BLD AUTO: 12.6 %
NEUTROPHILS # BLD AUTO: 3.19 K/UL
NEUTROPHILS NFR BLD AUTO: 76.2 %
NITRITE URINE: NEGATIVE
NONHDLC SERPL-MCNC: 138 MG/DL
PH URINE: 5.5
PLATELET # BLD AUTO: 258 K/UL
POTASSIUM SERPL-SCNC: 5.1 MMOL/L
PROT SERPL-MCNC: 7.1 G/DL
PROTEIN URINE: NEGATIVE
RBC # BLD: 4.3 M/UL
RBC # FLD: 15.5 %
SODIUM SERPL-SCNC: 138 MMOL/L
SPECIFIC GRAVITY URINE: 1.02
TRIGL SERPL-MCNC: 50 MG/DL
TSH SERPL-ACNC: 2.33 UIU/ML
UROBILINOGEN URINE: NORMAL
WBC # FLD AUTO: 4.19 K/UL

## 2022-07-15 ENCOUNTER — APPOINTMENT (OUTPATIENT)
Dept: MAMMOGRAPHY | Facility: CLINIC | Age: 62
End: 2022-07-15

## 2022-07-15 ENCOUNTER — OUTPATIENT (OUTPATIENT)
Dept: OUTPATIENT SERVICES | Facility: HOSPITAL | Age: 62
LOS: 1 days | End: 2022-07-15
Payer: COMMERCIAL

## 2022-07-15 ENCOUNTER — RESULT REVIEW (OUTPATIENT)
Age: 62
End: 2022-07-15

## 2022-07-15 DIAGNOSIS — Z98.89 OTHER SPECIFIED POSTPROCEDURAL STATES: Chronic | ICD-10-CM

## 2022-07-15 DIAGNOSIS — Z12.31 ENCOUNTER FOR SCREENING MAMMOGRAM FOR MALIGNANT NEOPLASM OF BREAST: ICD-10-CM

## 2022-07-15 PROCEDURE — 77067 SCR MAMMO BI INCL CAD: CPT | Mod: 26

## 2022-07-15 PROCEDURE — 77063 BREAST TOMOSYNTHESIS BI: CPT | Mod: 26

## 2022-07-15 PROCEDURE — 77063 BREAST TOMOSYNTHESIS BI: CPT

## 2022-07-15 PROCEDURE — 77067 SCR MAMMO BI INCL CAD: CPT

## 2022-08-22 ENCOUNTER — LABORATORY RESULT (OUTPATIENT)
Age: 62
End: 2022-08-22

## 2022-08-22 ENCOUNTER — APPOINTMENT (OUTPATIENT)
Dept: FAMILY MEDICINE | Facility: CLINIC | Age: 62
End: 2022-08-22

## 2022-08-22 VITALS
HEART RATE: 100 BPM | HEIGHT: 60 IN | TEMPERATURE: 98.4 F | DIASTOLIC BLOOD PRESSURE: 84 MMHG | SYSTOLIC BLOOD PRESSURE: 134 MMHG | BODY MASS INDEX: 28.07 KG/M2 | OXYGEN SATURATION: 97 % | WEIGHT: 143 LBS

## 2022-08-22 VITALS — TEMPERATURE: 98.5 F

## 2022-08-22 DIAGNOSIS — R11.2 NAUSEA WITH VOMITING, UNSPECIFIED: ICD-10-CM

## 2022-08-22 DIAGNOSIS — K52.9 NONINFECTIVE GASTROENTERITIS AND COLITIS, UNSPECIFIED: ICD-10-CM

## 2022-08-22 PROCEDURE — 99214 OFFICE O/P EST MOD 30 MIN: CPT | Mod: 25

## 2022-08-22 PROCEDURE — 36415 COLL VENOUS BLD VENIPUNCTURE: CPT

## 2022-08-23 LAB
ALBUMIN SERPL ELPH-MCNC: 4.8 G/DL
ALP BLD-CCNC: 90 U/L
ALT SERPL-CCNC: 15 U/L
ANION GAP SERPL CALC-SCNC: 14 MMOL/L
AST SERPL-CCNC: 20 U/L
BASOPHILS # BLD AUTO: 0.02 K/UL
BASOPHILS NFR BLD AUTO: 0.4 %
BILIRUB SERPL-MCNC: 0.3 MG/DL
BUN SERPL-MCNC: 17 MG/DL
CALCIUM SERPL-MCNC: 10.1 MG/DL
CHLORIDE SERPL-SCNC: 102 MMOL/L
CO2 SERPL-SCNC: 25 MMOL/L
CREAT SERPL-MCNC: 0.84 MG/DL
EGFR: 79 ML/MIN/1.73M2
EOSINOPHIL # BLD AUTO: 0.03 K/UL
EOSINOPHIL NFR BLD AUTO: 0.5 %
GLUCOSE SERPL-MCNC: 120 MG/DL
HCT VFR BLD CALC: 40.3 %
HGB BLD-MCNC: 13.9 G/DL
IMM GRANULOCYTES NFR BLD AUTO: 0.4 %
LYMPHOCYTES # BLD AUTO: 0.24 K/UL
LYMPHOCYTES NFR BLD AUTO: 4.3 %
MAN DIFF?: NORMAL
MCHC RBC-ENTMCNC: 29.1 PG
MCHC RBC-ENTMCNC: 34.5 GM/DL
MCV RBC AUTO: 84.3 FL
MONOCYTES # BLD AUTO: 0.44 K/UL
MONOCYTES NFR BLD AUTO: 7.9 %
NEUTROPHILS # BLD AUTO: 4.83 K/UL
NEUTROPHILS NFR BLD AUTO: 86.5 %
PLATELET # BLD AUTO: 266 K/UL
POTASSIUM SERPL-SCNC: 4.1 MMOL/L
PROT SERPL-MCNC: 7.4 G/DL
RBC # BLD: 4.78 M/UL
RBC # FLD: 13.8 %
SODIUM SERPL-SCNC: 141 MMOL/L
WBC # FLD AUTO: 5.58 K/UL

## 2022-08-25 LAB
INFLUENZA A RESULT: NOT DETECTED
INFLUENZA B RESULT: NOT DETECTED
RESP SYN VIRUS RESULT: NOT DETECTED
SARS-COV-2 RESULT: NOT DETECTED

## 2022-08-27 NOTE — PHYSICAL EXAM
[No Acute Distress] : no acute distress [Normal Sclera/Conjunctiva] : normal sclera/conjunctiva [No JVD] : no jugular venous distention [No Respiratory Distress] : no respiratory distress  [Normal Rate] : normal rate  [No Edema] : there was no peripheral edema [No Focal Deficits] : no focal deficits [Speech Grossly Normal] : speech grossly normal [Memory Grossly Normal] : memory grossly normal [Alert and Oriented x3] : oriented to person, place, and time [Normal Mood] : the mood was normal [de-identified] : calm and engaging

## 2022-08-27 NOTE — HISTORY OF PRESENT ILLNESS
[FreeTextEntry8] : FLORIN MARIA is a 62 year old F who presents today for acute stomach pains/gastroenteritis onset Tuesday 8/16/22, was not able to work through discomfort, vomited bile, cites nausea and dizziness.  Pt states she did not work Wednesday, pains are palliated by laying horizontal.  Soft BM reported, multiple bouts of vomiting reported.  Worked sparingly throughout the week, conditions becomes worse throughout the day.  Reports no fever, no SOB at any time since onset.  \par \par Pt states she did not test for COVID-19.

## 2022-08-27 NOTE — ASSESSMENT
[FreeTextEntry1] : Acute encounter for 62 year old F with PMH as stated in HPI / active list. \par \par Management : \par \par See HPI and Plan.\par \par Labs, COVID-19 test in office today, will advise. \par \par Zofran for nausea \par \par Note provided regarding work absence.  \par \par Best wishes offered!

## 2022-08-27 NOTE — REVIEW OF SYSTEMS
[Abdominal Pain] : abdominal pain [Nausea] : nausea [Vomiting] : vomiting [Negative] : Respiratory [Constipation] : no constipation [Diarrhea] : diarrhea [Heartburn] : no heartburn [Melena] : no melena [FreeTextEntry3] : eyeglasses

## 2022-08-27 NOTE — ADDENDUM
[FreeTextEntry1] : Medical record entries made by the scribe today, were at my direction and personally dictated to them by me, Dr. Nikole Boudreaux on 08/22/2022.  I have reviewed the chart and agree that the record accurately reflects my personal performance of the history, physical exam, assessment and plan.\par \par Vladimir GOLDSMITH acting as scribe for Dr. Nikole Boudreaux MD on 08/22/2022 at 2:09 PM.\par

## 2022-09-16 ENCOUNTER — APPOINTMENT (OUTPATIENT)
Dept: MAMMOGRAPHY | Facility: CLINIC | Age: 62
End: 2022-09-16

## 2022-09-16 ENCOUNTER — APPOINTMENT (OUTPATIENT)
Dept: ULTRASOUND IMAGING | Facility: CLINIC | Age: 62
End: 2022-09-16

## 2022-10-18 ENCOUNTER — APPOINTMENT (OUTPATIENT)
Dept: CARDIOLOGY | Facility: CLINIC | Age: 62
End: 2022-10-18

## 2023-03-30 ENCOUNTER — APPOINTMENT (OUTPATIENT)
Dept: PULMONOLOGY | Facility: CLINIC | Age: 63
End: 2023-03-30

## 2023-05-18 ENCOUNTER — APPOINTMENT (OUTPATIENT)
Dept: PULMONOLOGY | Facility: CLINIC | Age: 63
End: 2023-05-18
Payer: MEDICAID

## 2023-05-18 VITALS
SYSTOLIC BLOOD PRESSURE: 130 MMHG | HEART RATE: 89 BPM | RESPIRATION RATE: 16 BRPM | WEIGHT: 143 LBS | OXYGEN SATURATION: 97 % | HEIGHT: 60 IN | BODY MASS INDEX: 28.07 KG/M2 | DIASTOLIC BLOOD PRESSURE: 78 MMHG

## 2023-05-18 PROCEDURE — 99213 OFFICE O/P EST LOW 20 MIN: CPT

## 2023-05-18 RX ORDER — FLUTICASONE FUROATE AND VILANTEROL TRIFENATATE 100; 25 UG/1; UG/1
100-25 POWDER RESPIRATORY (INHALATION) DAILY
Qty: 180 | Refills: 3 | Status: DISCONTINUED | COMMUNITY
Start: 2021-04-12 | End: 2023-05-18

## 2023-05-18 RX ORDER — ONDANSETRON 4 MG/1
4 TABLET ORAL EVERY 4 HOURS
Qty: 14 | Refills: 0 | Status: DISCONTINUED | COMMUNITY
Start: 2022-08-22 | End: 2023-05-18

## 2023-05-18 RX ORDER — ALBUTEROL SULFATE 90 UG/1
108 (90 BASE) INHALANT RESPIRATORY (INHALATION)
Qty: 1 | Refills: 5 | Status: ACTIVE | COMMUNITY
Start: 2023-05-18 | End: 1900-01-01

## 2023-05-18 RX ORDER — LANSOPRAZOLE 30 MG/1
30 CAPSULE, DELAYED RELEASE ORAL DAILY
Qty: 90 | Refills: 3 | Status: DISCONTINUED | COMMUNITY
Start: 2020-11-16 | End: 2023-05-18

## 2023-05-18 RX ORDER — ALBUTEROL SULFATE 90 UG/1
108 (90 BASE) INHALANT RESPIRATORY (INHALATION)
Qty: 3 | Refills: 1 | Status: DISCONTINUED | COMMUNITY
End: 2023-05-18

## 2023-05-18 NOTE — HISTORY OF PRESENT ILLNESS
[TextBox_4] : \par no fever, chill, chest pain\par no sputum\par  at  baseline per pt\par Cardiac work up negative for Sarcoid 2015,MRI\par Does use albuterol daily, ran out of Breo\par no acute pulmonary complaints\par Last seen 4/21 [FreeTextEntry1] : \par

## 2023-05-18 NOTE — DISCUSSION/SUMMARY
[FreeTextEntry1] : Fibronodular Sarcoidosis radiographic stage II, moderate persistent asthma, \par at  baseline, But need to restart Breo daily\par exam without bronchospasm, normal sp02, \par Recent labs, normal and calcium 8/22\par Last  CT scan 11/20 table  parenchymal sarcoid , perihilar nodular masses stable from 10/19\par Needs updated Spirometry and CT scan\par 3 months or sooner if needed\par She will contact me sooner if any change in status

## 2023-05-18 NOTE — CONSULT LETTER
[Dear  ___] : Dear  [unfilled], [Consult Letter:] : I had the pleasure of evaluating your patient, [unfilled]. [Please see my note below.] : Please see my note below. [Consult Closing:] : Thank you very much for allowing me to participate in the care of this patient.  If you have any questions, please do not hesitate to contact me. [Sincerely,] : Sincerely, [DrPerico  ___] : Dr. JEFFERSON [[facility]onel Jefferson DO, [facility]] : [facility] DO, [facility] [Director, Respiratory Care] : Director, Respiratory Care [[facility]] : [facility] [FreeTextEntry3] : [facility] DO [facility]\par Pulmonary Critical Care\par Director Pulmonary Division\par Medical Director Respiratory Therapy\par [facility]\par \par

## 2023-06-12 RX ORDER — FLUTICASONE FUROATE AND VILANTEROL TRIFENATATE 100; 25 UG/1; UG/1
100-25 POWDER RESPIRATORY (INHALATION)
Qty: 60 | Refills: 5 | Status: DISCONTINUED | COMMUNITY
Start: 2023-05-18 | End: 2023-06-12

## 2023-06-27 ENCOUNTER — OFFICE (OUTPATIENT)
Dept: URBAN - METROPOLITAN AREA CLINIC 12 | Facility: CLINIC | Age: 63
Setting detail: OPHTHALMOLOGY
End: 2023-06-27
Payer: MEDICAID

## 2023-06-27 DIAGNOSIS — H35.3132: ICD-10-CM

## 2023-06-27 DIAGNOSIS — H43.813: ICD-10-CM

## 2023-06-27 DIAGNOSIS — H26.491: ICD-10-CM

## 2023-06-27 DIAGNOSIS — H35.373: ICD-10-CM

## 2023-06-27 DIAGNOSIS — H20.013: ICD-10-CM

## 2023-06-27 DIAGNOSIS — H53.003: ICD-10-CM

## 2023-06-27 DIAGNOSIS — H40.043: ICD-10-CM

## 2023-06-27 DIAGNOSIS — H40.013: ICD-10-CM

## 2023-06-27 PROCEDURE — 92250 FUNDUS PHOTOGRAPHY W/I&R: CPT | Performed by: OPHTHALMOLOGY

## 2023-06-27 PROCEDURE — 92083 EXTENDED VISUAL FIELD XM: CPT | Performed by: OPHTHALMOLOGY

## 2023-06-27 PROCEDURE — 92014 COMPRE OPH EXAM EST PT 1/>: CPT | Performed by: OPHTHALMOLOGY

## 2023-06-27 ASSESSMENT — REFRACTION_CURRENTRX
OS_SPHERE: -0.25
OS_OVR_VA: 20/
OD_VPRISM_DIRECTION: PROGS
OS_ADD: +2.50
OD_ADD: +2.50
OS_SPHERE: -0.25
OS_VPRISM_DIRECTION: PROGS
OS_OVR_VA: 20/
OD_OVR_VA: 20/
OD_AXIS: 029
OD_OVR_VA: 20/
OS_CYLINDER: -0.75
OD_ADD: +2.50
OD_SPHERE: +0.50
OS_AXIS: 083
OD_SPHERE: +0.50
OS_CYLINDER: -1.00
OS_VPRISM_DIRECTION: PROGS
OD_AXIS: 027
OD_VPRISM_DIRECTION: PROGS
OS_ADD: +2.50
OD_CYLINDER: -1.00
OD_CYLINDER: -0.75
OS_AXIS: 074

## 2023-06-27 ASSESSMENT — REFRACTION_MANIFEST
OD_VA1: 20/20-2
OD_SPHERE: -0.25
OS_VA1: 20/25-1
OS_ADD: +2.50
OS_AXIS: 082
OD_AXIS: 028
OD_CYLINDER: -1.00
OS_SPHERE: -0.25
OD_VA1: 20/20-1
OS_SPHERE: -0.25
OS_CYLINDER: -1.00
OD_SPHERE: +0.50
OS_CYLINDER: -0.75
OD_ADD: +2.50
OS_AXIS: 080
OS_ADD: +2.50
OD_CYLINDER: -1.00
OS_VA1: 20/25-2
OD_ADD: +2.50
OD_AXIS: 037

## 2023-06-27 ASSESSMENT — SPHEQUIV_DERIVED
OS_SPHEQUIV: -0.625
OD_SPHEQUIV: 0
OD_SPHEQUIV: -0.75
OS_SPHEQUIV: -0.75
OS_SPHEQUIV: -1.25
OD_SPHEQUIV: -0.75

## 2023-06-27 ASSESSMENT — REFRACTION_AUTOREFRACTION
OD_CYLINDER: -1.00
OS_AXIS: 083
OD_AXIS: 042
OS_CYLINDER: -1.50
OS_SPHERE: -0.50
OD_SPHERE: -0.25

## 2023-06-27 ASSESSMENT — AXIALLENGTH_DERIVED
OD_AL: 23.2465
OD_AL: 22.9662
OS_AL: 23.023
OS_AL: 23.2573
OD_AL: 23.2465
OS_AL: 23.0695

## 2023-06-27 ASSESSMENT — KERATOMETRY
OD_AXISANGLE_DEGREES: 128
OS_K2POWER_DIOPTERS: 46.50
OD_K1POWER_DIOPTERS: 44.75
METHOD_AUTO_MANUAL: AUTO
OS_AXISANGLE_DEGREES: 173
OS_K1POWER_DIOPTERS: 45.00
OD_K2POWER_DIOPTERS: 45.75

## 2023-06-27 ASSESSMENT — VISUAL ACUITY
OD_BCVA: 20/40
OS_BCVA: 20/30-2

## 2023-06-27 ASSESSMENT — TONOMETRY
OD_IOP_MMHG: 19
OS_IOP_MMHG: 17

## 2023-06-27 ASSESSMENT — CONFRONTATIONAL VISUAL FIELD TEST (CVF)
OD_FINDINGS: FULL
OS_FINDINGS: FULL

## 2023-07-31 ENCOUNTER — APPOINTMENT (OUTPATIENT)
Dept: FAMILY MEDICINE | Facility: CLINIC | Age: 63
End: 2023-07-31
Payer: MEDICAID

## 2023-07-31 DIAGNOSIS — Z12.11 ENCOUNTER FOR SCREENING FOR MALIGNANT NEOPLASM OF COLON: ICD-10-CM

## 2023-07-31 DIAGNOSIS — Z00.00 ENCOUNTER FOR GENERAL ADULT MEDICAL EXAMINATION W/OUT ABNORMAL FINDINGS: ICD-10-CM

## 2023-07-31 PROCEDURE — 99396 PREV VISIT EST AGE 40-64: CPT

## 2023-08-06 PROBLEM — Z12.11 SCREENING FOR COLON CANCER: Status: ACTIVE | Noted: 2017-01-18

## 2023-08-06 NOTE — PHYSICAL EXAM
[General Appearance - Alert] : alert [General Appearance - In No Acute Distress] : in no acute distress [General Appearance - Well-Appearing] : healthy appearing [Sclera] : the sclera and conjunctiva were normal [PERRL With Normal Accommodation] : pupils were equal in size, round, and reactive to light [Thyroid Diffuse Enlargement] : the thyroid was not enlarged [Respiration, Rhythm And Depth] : normal respiratory rhythm and effort [Auscultation Breath Sounds / Voice Sounds] : lungs were clear to auscultation bilaterally [Edema] : there was no peripheral edema [Veins - Varicosity Changes] : there were no varicosital changes [Bowel Sounds] : normal bowel sounds [Abdomen Soft] : soft [Abdomen Mass (___ Cm)] : no abdominal mass palpated [Cervical Lymph Nodes Enlarged Posterior Bilaterally] : posterior cervical [Cervical Lymph Nodes Enlarged Anterior Bilaterally] : anterior cervical [Supraclavicular Lymph Nodes Enlarged Bilaterally] : supraclavicular [Abnormal Walk] : normal gait [Nail Clubbing] : no clubbing  or cyanosis of the fingernails [Involuntary Movements] : no involuntary movements were seen [Skin Turgor] : normal skin turgor [] : no rash [Oriented To Time, Place, And Person] : oriented to person, place, and time [Impaired Insight] : insight and judgment were intact [No Acute Distress] : no acute distress [Normal Sclera/Conjunctiva] : normal sclera/conjunctiva [PERRL] : pupils equal round and reactive to light [Normal Outer Ear/Nose] : the outer ears and nose were normal in appearance [Normal Oropharynx] : the oropharynx was normal [Supple] : supple [Thyroid Normal, No Nodules] : the thyroid was normal and there were no nodules present [No Respiratory Distress] : no respiratory distress  [No Accessory Muscle Use] : no accessory muscle use [Clear to Auscultation] : lungs were clear to auscultation bilaterally [Normal Rate] : normal rate  [Regular Rhythm] : with a regular rhythm [No Murmur] : no murmur heard [No Edema] : there was no peripheral edema [Soft] : abdomen soft [Non Tender] : non-tender [No CVA Tenderness] : no CVA  tenderness [Grossly Normal Strength/Tone] : grossly normal strength/tone [No Focal Deficits] : no focal deficits [Deep Tendon Reflexes (DTR)] : deep tendon reflexes were 2+ and symmetric [Normal Affect] : the affect was normal [Alert and Oriented x3] : oriented to person, place, and time [Normal Insight/Judgement] : insight and judgment were intact [FreeTextEntry1] : warm and dry  [de-identified] : calm and engaging [de-identified] : heavy cerumen present in L [de-identified] : eyeglasses

## 2023-08-06 NOTE — ASSESSMENT
[FreeTextEntry1] : Annual exam for 63-year old F with PMH as stated in HPI / active list.   Management:   Mammogram and US script provided.  Colon cancer screening.  Cologuard to be mailed.   See HPI and Plan  Labs in office today.   Will advise.   Medications reconciled.  Best wishes offered.

## 2023-08-06 NOTE — ADDENDUM
[FreeTextEntry1] : Medical record entries made by the scribe today, were at my direction and personally dictated to them by me, Dr. Nikole Boudreaux on 08/03/2023.  I have reviewed the chart and agree that the record accurately reflects my personal performance of the history, physical exam, assessment and plan.  I, Zheng Talavera  acting as scribe for Dr. Nikole Boudreaux MD on 08/03/2023 at 11:57 AM.

## 2023-08-06 NOTE — REVIEW OF SYSTEMS
[see HPI] : see HPI [Anxiety] : anxiety [Headache] : headache [Negative] : Psychiatric [Chills] : no chills [Feeling Tired] : not feeling tired [Eyesight Problems] : no eyesight problems [Dry Eyes] : no dryness of the eyes [Cough] : no cough [SOB on Exertion] : no shortness of breath during exertion [PND] : no PND [Melena] : no melena [Confused] : no confusion [Difficulty Walking] : no difficulty walking [Sleep Disturbances] : no sleep disturbances [Change In Personality] : no personality change [Fever] : no fever [Dizziness] : no dizziness [Fainting] : no fainting [Confusion] : no confusion [Memory Loss] : no memory loss [Unsteady Walking] : no ataxia [FreeTextEntry3] : eyeglasses [FreeTextEntry6] : see HPI [de-identified] : see HPI

## 2023-08-06 NOTE — COUNSELING
[Healthy eating counseling provided] : healthy eating [Activity counseling provided] : activity [de-identified] : core strengthening advised

## 2023-08-06 NOTE — HEALTH RISK ASSESSMENT
[Very Good] : ~his/her~  mood as very good [No falls in past year] : Patient reported no falls in the past year [0] : 2) Feeling down, depressed, or hopeless: Not at all (0) [PHQ-2 Negative - No further assessment needed] : PHQ-2 Negative - No further assessment needed [Patient reported mammogram was normal] : Patient reported mammogram was normal [Patient reported PAP Smear was normal] : Patient reported PAP Smear was normal [Patient reported bone density results were normal] : Patient reported bone density results were normal [Financial] : financial [Employed] : employed [College] : College [] :  [# Of Children ___] : has [unfilled] children [Feels Safe at Home] : Feels safe at home [Fully functional (bathing, dressing, toileting, transferring, walking, feeding)] : Fully functional (bathing, dressing, toileting, transferring, walking, feeding) [Fully functional (using the telephone, shopping, preparing meals, housekeeping, doing laundry, using] : Fully functional and needs no help or supervision to perform IADLs (using the telephone, shopping, preparing meals, housekeeping, doing laundry, using transportation, managing medications and managing finances) [Smoke Detector] : smoke detector [Seat Belt] :  uses seat belt [de-identified] : Pulmonology   Cardiology   [de-identified] : work! 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

## 2023-08-06 NOTE — HISTORY OF PRESENT ILLNESS
[de-identified] : In review February 2022:  CPE Last seen in June 2021 for FU by Dr. Montano, last CPE September 2020, encounters reviewed.   FLORIN MARIA is a 61 year old F who presents today for a CPE.  Pt has no complaints, has been well, and denies any recent ER visits/hospitalizations/MVA's or MSK injuries.   "Last year I skipped everything, now I'm getting back on track."   No Mammogram last year, has not followed with PULM in some time.    Has since finished school to be an  grades 1-8, very proud and ready to begin new career, continues to work at [employer] full time in the mean time.  Pt states that she walks "a ton" at work.   Pt states that she continues to live with her mother.  Son lives nearby.  [FreeTextEntry1] : In review: Jan 2015 \par  54 year old Armenian  female presents today to establish care being referred to me by insurance.\par  She is a bit medical history significant for sarcoidosis, which was diagnosed at the age of 39.  \par  Follow with Pulmonology and reports stability.  We'll request office know.  Also has a history of hypertension.\par   Recent weeks, feels upper chest discomfort with worsening cough and nasal congestion.\par  No worsening SOB. No fever or chills. \par  Not able to get appt. with pulmonology until next week.\par  \par  She describes an episode of blacking out in mid December.  She was in her kitchen standing at near counter  when she sensed blackness and woke up on the floor.  This event was unwitnessed.  One other episode in her lifetime years ago with cardiac  workup negative.\par  \par  Social:   from .  2 children  ( 21 and 25 )  live with him.\par  attempting employment in cosmetic industry.  Mother lives in area and a support.

## 2023-08-22 ENCOUNTER — APPOINTMENT (OUTPATIENT)
Dept: CT IMAGING | Facility: CLINIC | Age: 63
End: 2023-08-22
Payer: MEDICAID

## 2023-08-22 ENCOUNTER — OUTPATIENT (OUTPATIENT)
Dept: OUTPATIENT SERVICES | Facility: HOSPITAL | Age: 63
LOS: 1 days | End: 2023-08-22
Payer: MEDICAID

## 2023-08-22 DIAGNOSIS — D86.0 SARCOIDOSIS OF LUNG: ICD-10-CM

## 2023-08-22 DIAGNOSIS — Z98.89 OTHER SPECIFIED POSTPROCEDURAL STATES: Chronic | ICD-10-CM

## 2023-08-22 DIAGNOSIS — R91.8 OTHER NONSPECIFIC ABNORMAL FINDING OF LUNG FIELD: ICD-10-CM

## 2023-08-22 PROCEDURE — 71250 CT THORAX DX C-: CPT

## 2023-08-22 PROCEDURE — 71250 CT THORAX DX C-: CPT | Mod: 26

## 2023-09-01 ENCOUNTER — NON-APPOINTMENT (OUTPATIENT)
Age: 63
End: 2023-09-01

## 2023-09-12 ENCOUNTER — APPOINTMENT (OUTPATIENT)
Dept: PULMONOLOGY | Facility: CLINIC | Age: 63
End: 2023-09-12

## 2023-09-12 ENCOUNTER — APPOINTMENT (OUTPATIENT)
Dept: PULMONOLOGY | Facility: CLINIC | Age: 63
End: 2023-09-12
Payer: MEDICAID

## 2023-09-12 VITALS
DIASTOLIC BLOOD PRESSURE: 60 MMHG | SYSTOLIC BLOOD PRESSURE: 120 MMHG | OXYGEN SATURATION: 97 % | HEART RATE: 76 BPM | RESPIRATION RATE: 16 BRPM

## 2023-09-12 VITALS — BODY MASS INDEX: 28.27 KG/M2 | HEIGHT: 60 IN | WEIGHT: 144 LBS

## 2023-09-12 DIAGNOSIS — R05.9 COUGH, UNSPECIFIED: ICD-10-CM

## 2023-09-12 PROCEDURE — 99214 OFFICE O/P EST MOD 30 MIN: CPT | Mod: 25

## 2023-09-12 PROCEDURE — 94010 BREATHING CAPACITY TEST: CPT

## 2023-09-12 RX ORDER — MOMETASONE FUROATE AND FORMOTEROL FUMARATE DIHYDRATE 100; 5 UG/1; UG/1
100-5 AEROSOL RESPIRATORY (INHALATION)
Qty: 3 | Refills: 0 | Status: COMPLETED | COMMUNITY
Start: 2023-06-12 | End: 2023-09-12

## 2023-09-21 ENCOUNTER — RESULT REVIEW (OUTPATIENT)
Age: 63
End: 2023-09-21

## 2023-09-21 ENCOUNTER — OUTPATIENT (OUTPATIENT)
Dept: OUTPATIENT SERVICES | Facility: HOSPITAL | Age: 63
LOS: 1 days | End: 2023-09-21
Payer: MEDICAID

## 2023-09-21 ENCOUNTER — APPOINTMENT (OUTPATIENT)
Dept: MAMMOGRAPHY | Facility: CLINIC | Age: 63
End: 2023-09-21
Payer: MEDICAID

## 2023-09-21 ENCOUNTER — APPOINTMENT (OUTPATIENT)
Dept: ULTRASOUND IMAGING | Facility: CLINIC | Age: 63
End: 2023-09-21
Payer: MEDICAID

## 2023-09-21 DIAGNOSIS — Z12.31 ENCOUNTER FOR SCREENING MAMMOGRAM FOR MALIGNANT NEOPLASM OF BREAST: ICD-10-CM

## 2023-09-21 DIAGNOSIS — Z13.820 ENCOUNTER FOR SCREENING FOR OSTEOPOROSIS: ICD-10-CM

## 2023-09-21 DIAGNOSIS — Z98.89 OTHER SPECIFIED POSTPROCEDURAL STATES: Chronic | ICD-10-CM

## 2023-09-21 PROCEDURE — G0279: CPT

## 2023-09-21 PROCEDURE — 76641 ULTRASOUND BREAST COMPLETE: CPT | Mod: 26,50

## 2023-09-21 PROCEDURE — 77066 DX MAMMO INCL CAD BI: CPT

## 2023-09-21 PROCEDURE — 77066 DX MAMMO INCL CAD BI: CPT | Mod: 26

## 2023-09-21 PROCEDURE — G0279: CPT | Mod: 26

## 2023-09-21 PROCEDURE — 76641 ULTRASOUND BREAST COMPLETE: CPT

## 2023-10-23 ENCOUNTER — APPOINTMENT (OUTPATIENT)
Dept: FAMILY MEDICINE | Facility: CLINIC | Age: 63
End: 2023-10-23
Payer: MEDICAID

## 2023-10-23 VITALS
HEART RATE: 126 BPM | DIASTOLIC BLOOD PRESSURE: 74 MMHG | WEIGHT: 144 LBS | BODY MASS INDEX: 28.27 KG/M2 | HEIGHT: 60 IN | SYSTOLIC BLOOD PRESSURE: 116 MMHG | OXYGEN SATURATION: 98 %

## 2023-10-23 DIAGNOSIS — G43.109 MIGRAINE WITH AURA, NOT INTRACTABLE, W/OUT STATUS MIGRAINOSUS: ICD-10-CM

## 2023-10-23 PROCEDURE — 99214 OFFICE O/P EST MOD 30 MIN: CPT

## 2023-11-10 PROBLEM — H16.223 DRY EYE SYNDROME K SICCA; BOTH EYES: Status: ACTIVE | Noted: 2023-11-10

## 2023-12-05 ENCOUNTER — APPOINTMENT (OUTPATIENT)
Dept: PULMONOLOGY | Facility: CLINIC | Age: 63
End: 2023-12-05

## 2024-02-14 ENCOUNTER — OFFICE (OUTPATIENT)
Dept: URBAN - METROPOLITAN AREA CLINIC 12 | Facility: CLINIC | Age: 64
Setting detail: OPHTHALMOLOGY
End: 2024-02-14
Payer: MEDICAID

## 2024-02-14 DIAGNOSIS — Z96.1: ICD-10-CM

## 2024-02-14 DIAGNOSIS — H40.043: ICD-10-CM

## 2024-02-14 DIAGNOSIS — H26.491: ICD-10-CM

## 2024-02-14 DIAGNOSIS — H43.813: ICD-10-CM

## 2024-02-14 DIAGNOSIS — H35.373: ICD-10-CM

## 2024-02-14 DIAGNOSIS — H20.013: ICD-10-CM

## 2024-02-14 DIAGNOSIS — H40.013: ICD-10-CM

## 2024-02-14 DIAGNOSIS — H53.003: ICD-10-CM

## 2024-02-14 DIAGNOSIS — H35.3132: ICD-10-CM

## 2024-02-14 PROCEDURE — 92014 COMPRE OPH EXAM EST PT 1/>: CPT | Performed by: OPHTHALMOLOGY

## 2024-02-14 PROCEDURE — 92134 CPTRZ OPH DX IMG PST SGM RTA: CPT | Performed by: OPHTHALMOLOGY

## 2024-02-14 ASSESSMENT — REFRACTION_CURRENTRX
OD_OVR_VA: 20/
OD_OVR_VA: 20/
OD_AXIS: 027
OS_AXIS: 074
OS_ADD: +2.00
OD_CYLINDER: -0.75
OS_OVR_VA: 20/
OS_VPRISM_DIRECTION: PROGS
OD_ADD: +2.00
OS_SPHERE: -0.25
OS_VPRISM_DIRECTION: PROGS
OS_OVR_VA: 20/
OD_VPRISM_DIRECTION: PROGS
OD_SPHERE: +0.25
OS_CYLINDER: -0.75
OD_VPRISM_DIRECTION: PROGS
OS_AXIS: 083
OS_ADD: +2.50
OD_ADD: +2.50
OD_SPHERE: +0.50
OS_SPHERE: -0.25
OS_CYLINDER: -1.00
OD_AXIS: 059
OD_CYLINDER: -1.50

## 2024-02-14 ASSESSMENT — CONFRONTATIONAL VISUAL FIELD TEST (CVF)
OS_FINDINGS: FULL
OD_FINDINGS: FULL

## 2024-02-14 ASSESSMENT — REFRACTION_AUTOREFRACTION
OD_SPHERE: PLANO
OD_AXIS: 038
OS_CYLINDER: -1.25
OD_CYLINDER: -1.25
OS_AXIS: 090
OS_SPHERE: -0.25

## 2024-02-14 ASSESSMENT — REFRACTION_MANIFEST
OS_CYLINDER: -1.25
OS_SPHERE: -0.25
OD_SPHERE: PLANO
OS_ADD: +2.50
OD_AXIS: 037
OS_AXIS: 082
OD_VA1: 20/20-2
OS_CYLINDER: -1.00
OS_SPHERE: -0.25
OS_SPHERE: -0.25
OD_CYLINDER: -1.00
OD_ADD: +2.50
OS_AXIS: 080
OS_VA1: 30-2
OD_SPHERE: -0.25
OS_VA1: 20/25-1
OS_AXIS: 090
OS_ADD: +2.50
OS_CYLINDER: -0.75
OD_VA1: 20/20-1
OD_ADD: +2.50
OS_VA1: 20/25-2
OD_AXIS: 028
OD_AXIS: 040
OD_CYLINDER: -1.25
OD_SPHERE: +0.50
OD_CYLINDER: -1.00

## 2024-02-14 ASSESSMENT — SPHEQUIV_DERIVED
OD_SPHEQUIV: -0.75
OS_SPHEQUIV: -0.875
OS_SPHEQUIV: -0.75
OS_SPHEQUIV: -0.875
OD_SPHEQUIV: 0
OS_SPHEQUIV: -0.625

## 2024-03-06 ENCOUNTER — APPOINTMENT (OUTPATIENT)
Dept: PULMONOLOGY | Facility: CLINIC | Age: 64
End: 2024-03-06
Payer: MEDICAID

## 2024-03-06 VITALS
DIASTOLIC BLOOD PRESSURE: 80 MMHG | OXYGEN SATURATION: 97 % | RESPIRATION RATE: 16 BRPM | HEIGHT: 60 IN | WEIGHT: 140 LBS | BODY MASS INDEX: 27.48 KG/M2 | HEART RATE: 94 BPM | SYSTOLIC BLOOD PRESSURE: 130 MMHG

## 2024-03-06 DIAGNOSIS — J45.40 MODERATE PERSISTENT ASTHMA, UNCOMPLICATED: ICD-10-CM

## 2024-03-06 DIAGNOSIS — D86.0 SARCOIDOSIS OF LUNG: ICD-10-CM

## 2024-03-06 DIAGNOSIS — R91.8 OTHER NONSPECIFIC ABNORMAL FINDING OF LUNG FIELD: ICD-10-CM

## 2024-03-06 DIAGNOSIS — D86.9 SARCOIDOSIS, UNSPECIFIED: ICD-10-CM

## 2024-03-06 PROCEDURE — 99214 OFFICE O/P EST MOD 30 MIN: CPT

## 2024-03-06 PROCEDURE — G2211 COMPLEX E/M VISIT ADD ON: CPT | Mod: NC,1L

## 2024-03-06 RX ORDER — SUMATRIPTAN 25 MG/1
25 TABLET, FILM COATED ORAL
Qty: 1 | Refills: 0 | Status: DISCONTINUED | COMMUNITY
Start: 2023-10-23 | End: 2024-03-06

## 2024-03-06 RX ORDER — PREDNISONE 5 MG/1
5 TABLET ORAL
Qty: 90 | Refills: 1 | Status: DISCONTINUED | COMMUNITY
Start: 2023-09-01 | End: 2024-03-06

## 2024-03-06 RX ORDER — FLUTICASONE FUROATE AND VILANTEROL TRIFENATATE 100; 25 UG/1; UG/1
100-25 POWDER RESPIRATORY (INHALATION)
Qty: 60 | Refills: 5 | Status: DISCONTINUED | COMMUNITY
Start: 2023-09-12 | End: 2024-03-06

## 2024-03-06 NOTE — REVIEW OF SYSTEMS
[As Noted in HPI] : as noted in HPI [Negative] : Dermatologic [FreeTextEntry7] : cramping [FreeTextEntry9] : cardiac MRI negative, had syncope, BOP meds adjusted

## 2024-03-06 NOTE — REASON FOR VISIT
[Asthma] : asthma [Follow-Up] : a follow-up visit [Cough] : cough [Sarcoidosis] : sarcoidosis [FreeTextEntry2] : pneumonia

## 2024-03-06 NOTE — CONSULT LETTER
[Dear  ___] : Dear  [unfilled], [Consult Letter:] : I had the pleasure of evaluating your patient, [unfilled]. [Please see my note below.] : Please see my note below. [Consult Closing:] : Thank you very much for allowing me to participate in the care of this patient.  If you have any questions, please do not hesitate to contact me. [Sincerely,] : Sincerely, [DrPerico  ___] : Dr. JEFFERSON [[facility] DO, [facility]] : Le[facility] DO, [facility] [Director, Respiratory Care] : Director, Respiratory Care [[facility]] : [facility] [FreeTextEntry3] : Leonel Jefferson DO [facility]\par  Pulmonary Critical Care\par  Director Pulmonary Division\par  Medical Director Respiratory Therapy\par  [facility]\par  \par

## 2024-03-06 NOTE — PROCEDURE
[FreeTextEntry1] : CT scans 8/23 reviewed, mild progression noted spirometry with moderate air flow obstruction, decreased flows from 2020

## 2024-03-06 NOTE — HISTORY OF PRESENT ILLNESS
[Never] : never [TextBox_4] : no fever, chill, chest pain no sputum  at  baseline per pt Cardiac work up negative for Sarcoid 2015,MRI Does use albuterol daily, ran out of Breo  [FreeTextEntry1] : \par

## 2024-03-06 NOTE — PHYSICAL EXAM
[General Appearance - Well Developed] : well developed [Normal Appearance] : normal appearance [General Appearance - Well Nourished] : well nourished [No Deformities] : no deformities [Neck Appearance] : the appearance of the neck was normal [Heart Sounds] : normal S1 and S2 [Heart Rate And Rhythm] : heart rate and rhythm were normal [Murmurs] : no murmurs present [Edema] : no peripheral edema present [Respiration, Rhythm And Depth] : normal respiratory rhythm and effort [Auscultation Breath Sounds / Voice Sounds] : lungs were clear to auscultation bilaterally [Lungs Percussion] : the lungs were normal to percussion [Abnormal Walk] : normal gait [Nail Clubbing] : no clubbing of the fingernails [Cyanosis, Localized] : no localized cyanosis [] : no rash [No Focal Deficits] : no focal deficits [Oriented To Time, Place, And Person] : oriented to person, place, and time [Impaired Insight] : insight and judgment were intact [Affect] : the affect was normal [Memory Recent] : recent memory was not impaired [FreeTextEntry1] : No chest wall abnormality

## 2024-03-06 NOTE — DISCUSSION/SUMMARY
[FreeTextEntry1] : Fibronodular Sarcoidosis radiographic stage II, moderate persistent asthma,  CT scan with mild progression from 2020, ANA LAURA and bases, prior FFB  2014 with granulomas obstructing ANA LAURA bronchus  Deneys any SOB or CP, at baseline Prior Spirometry with worsening air flow obstruction, has not yet started Breo Advised to restart Breo, finished prednisone taper, does not want to restart exam without bronchospasm, normal sp02,  Repeat Spirometry and CT scan re ordered, not done in December  yearly optho, comprehensive profile, ACE level 3 months or sooner if needed, will call with above, bhanu at f/u

## 2024-08-12 ENCOUNTER — APPOINTMENT (OUTPATIENT)
Dept: CT IMAGING | Facility: CLINIC | Age: 64
End: 2024-08-12
Payer: MEDICAID

## 2024-08-12 PROCEDURE — 71250 CT THORAX DX C-: CPT | Mod: 26

## 2024-08-23 ENCOUNTER — NON-APPOINTMENT (OUTPATIENT)
Age: 64
End: 2024-08-23

## 2024-08-23 RX ORDER — PREDNISONE 10 MG/1
10 TABLET ORAL
Qty: 60 | Refills: 1 | Status: ACTIVE | COMMUNITY
Start: 2024-08-23 | End: 1900-01-01

## 2024-08-30 ENCOUNTER — NON-APPOINTMENT (OUTPATIENT)
Age: 64
End: 2024-08-30

## 2024-12-31 ENCOUNTER — APPOINTMENT (OUTPATIENT)
Dept: PULMONOLOGY | Facility: CLINIC | Age: 64
End: 2024-12-31

## 2025-02-13 ENCOUNTER — OFFICE (OUTPATIENT)
Dept: URBAN - METROPOLITAN AREA CLINIC 12 | Facility: CLINIC | Age: 65
Setting detail: OPHTHALMOLOGY
End: 2025-02-13
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
## 2025-02-13 PROCEDURE — NO SHOW FE NO SHOW FEE: Performed by: OPHTHALMOLOGY

## 2025-03-26 ENCOUNTER — APPOINTMENT (OUTPATIENT)
Dept: PULMONOLOGY | Facility: CLINIC | Age: 65
End: 2025-03-26

## 2025-05-01 ENCOUNTER — OFFICE (OUTPATIENT)
Dept: URBAN - METROPOLITAN AREA CLINIC 12 | Facility: CLINIC | Age: 65
Setting detail: OPHTHALMOLOGY
End: 2025-05-01
Payer: COMMERCIAL

## 2025-05-01 DIAGNOSIS — H20.013: ICD-10-CM

## 2025-05-01 DIAGNOSIS — H40.013: ICD-10-CM

## 2025-05-01 DIAGNOSIS — H43.813: ICD-10-CM

## 2025-05-01 DIAGNOSIS — H35.373: ICD-10-CM

## 2025-05-01 DIAGNOSIS — H35.3132: ICD-10-CM

## 2025-05-01 PROCEDURE — 92012 INTRM OPH EXAM EST PATIENT: CPT | Performed by: OPHTHALMOLOGY

## 2025-05-01 PROCEDURE — 92250 FUNDUS PHOTOGRAPHY W/I&R: CPT | Performed by: OPHTHALMOLOGY

## 2025-05-01 PROCEDURE — 92083 EXTENDED VISUAL FIELD XM: CPT | Performed by: OPHTHALMOLOGY

## 2025-05-01 PROCEDURE — 76514 ECHO EXAM OF EYE THICKNESS: CPT | Performed by: OPHTHALMOLOGY

## 2025-05-01 ASSESSMENT — REFRACTION_MANIFEST
OS_SPHERE: -0.25
OS_VA1: 20/20
OS_ADD: +2.50
OS_AXIS: 082
OS_CYLINDER: -1.00
OD_VA1: 20/20-2
OS_ADD: +2.50
OS_CYLINDER: -1.00
OS_SPHERE: -0.25
OD_ADD: +2.50
OD_AXIS: 037
OD_AXIS: 028
OD_SPHERE: -0.25
OD_ADD: +2.50
OD_CYLINDER: -1.00
OS_SPHERE: -0.25
OD_CYLINDER: -0.75
OD_VA1: 20/20-1
OS_VA1: 20/25-2
OS_CYLINDER: -0.75
OD_AXIS: 038
OD_VA1: 20/20
OS_ADD: +2.50
OD_CYLINDER: -1.00
OD_SPHERE: +0.50
OS_VA1: 20/25-1
OS_AXIS: 080
OS_AXIS: 080
OD_ADD: +2.50
OD_SPHERE: -0.25

## 2025-05-01 ASSESSMENT — CONFRONTATIONAL VISUAL FIELD TEST (CVF)
OD_FINDINGS: FULL
OS_FINDINGS: FULL

## 2025-05-01 ASSESSMENT — REFRACTION_CURRENTRX
OS_SPHERE: -0.25
OD_OVR_VA: 20/
OD_SPHERE: -0.25
OS_ADD: +2.50
OD_AXIS: 036
OD_VPRISM_DIRECTION: PROGS
OD_SPHERE: +0.50
OS_OVR_VA: 20/
OS_ADD: +2.00
OD_CYLINDER: -0.75
OS_CYLINDER: -1.00
OS_SPHERE: -0.25
OS_AXIS: 081
OD_ADD: +2.00
OD_CYLINDER: -0.75
OD_OVR_VA: 20/
OS_CYLINDER: -0.75
OS_OVR_VA: 20/
OD_VPRISM_DIRECTION: PROGS
OD_AXIS: 027
OS_VPRISM_DIRECTION: PROGS
OD_ADD: +2.50
OS_VPRISM_DIRECTION: PROGS
OS_AXIS: 074

## 2025-05-01 ASSESSMENT — REFRACTION_AUTOREFRACTION
OS_AXIS: 081
OD_CYLINDER: -1.00
OD_AXIS: 041
OS_CYLINDER: -2.00
OD_SPHERE: PLANO
OS_SPHERE: +0.25

## 2025-05-01 ASSESSMENT — PACHYMETRY
OS_CT_UM: 501
OD_CT_CORRECTION: 2
OS_CT_CORRECTION: 3
OD_CT_UM: 510

## 2025-05-01 ASSESSMENT — VISUAL ACUITY
OS_BCVA: 20/25+2
OD_BCVA: 20/40

## 2025-05-01 ASSESSMENT — TONOMETRY
OS_IOP_MMHG: 20
OD_IOP_MMHG: 18

## 2025-05-01 ASSESSMENT — KERATOMETRY
OS_AXISANGLE_DEGREES: 171
OD_AXISANGLE_DEGREES: 118
OS_K1POWER_DIOPTERS: 44.75
METHOD_AUTO_MANUAL: AUTO
OS_K2POWER_DIOPTERS: 46.50
OD_K1POWER_DIOPTERS: 45.00
OD_K2POWER_DIOPTERS: 46.00

## 2025-08-06 ENCOUNTER — APPOINTMENT (OUTPATIENT)
Dept: PULMONOLOGY | Facility: CLINIC | Age: 65
End: 2025-08-06